# Patient Record
Sex: FEMALE | Race: BLACK OR AFRICAN AMERICAN | NOT HISPANIC OR LATINO | Employment: OTHER | URBAN - METROPOLITAN AREA
[De-identification: names, ages, dates, MRNs, and addresses within clinical notes are randomized per-mention and may not be internally consistent; named-entity substitution may affect disease eponyms.]

---

## 2023-03-03 PROBLEM — M25.519 SHOULDER PAIN: Status: ACTIVE | Noted: 2023-03-03

## 2023-03-03 PROBLEM — R60.9 EDEMA: Status: ACTIVE | Noted: 2023-03-03

## 2023-03-03 PROBLEM — N28.9 LOW KIDNEY FUNCTION: Status: ACTIVE | Noted: 2023-03-03

## 2023-03-03 PROBLEM — N64.4 BREAST PAIN IN FEMALE: Status: ACTIVE | Noted: 2023-03-03

## 2023-03-03 PROBLEM — E11.9 DIABETES MELLITUS (MULTI): Status: ACTIVE | Noted: 2023-03-03

## 2023-03-03 PROBLEM — L20.9 ATOPIC DERMATITIS: Status: ACTIVE | Noted: 2023-03-03

## 2023-03-03 PROBLEM — R35.0 URINARY FREQUENCY: Status: ACTIVE | Noted: 2023-03-03

## 2023-03-03 PROBLEM — R09.82 PND (POST-NASAL DRIP): Status: ACTIVE | Noted: 2023-03-03

## 2023-03-03 PROBLEM — J31.0 CHRONIC RHINITIS: Status: ACTIVE | Noted: 2023-03-03

## 2023-03-03 PROBLEM — R82.90 URINE ABNORMALITY: Status: ACTIVE | Noted: 2023-03-03

## 2023-03-03 PROBLEM — G62.9 PERIPHERAL POLYNEUROPATHY: Status: ACTIVE | Noted: 2023-03-03

## 2023-03-03 PROBLEM — E87.1 HYPONATREMIA: Status: ACTIVE | Noted: 2023-03-03

## 2023-03-03 PROBLEM — I10 BENIGN ESSENTIAL HYPERTENSION: Status: ACTIVE | Noted: 2023-03-03

## 2023-03-03 PROBLEM — G56.03 CARPAL TUNNEL SYNDROME, BILATERAL UPPER LIMBS: Status: ACTIVE | Noted: 2023-03-03

## 2023-03-03 PROBLEM — M50.90 DISORDER OF INTERVERTEBRAL DISC OF CERVICAL SPINE: Status: ACTIVE | Noted: 2023-03-03

## 2023-03-03 PROBLEM — M85.80 OSTEOPENIA: Status: ACTIVE | Noted: 2023-03-03

## 2023-03-03 PROBLEM — R29.898 HEAVY SENSATION OF LOWER EXTREMITY: Status: ACTIVE | Noted: 2023-03-03

## 2023-03-03 PROBLEM — R41.82 ALTERED MENTAL STATUS: Status: ACTIVE | Noted: 2023-03-03

## 2023-03-03 PROBLEM — M25.561 RIGHT KNEE PAIN: Status: ACTIVE | Noted: 2023-03-03

## 2023-03-03 PROBLEM — R07.9 CHEST PAIN: Status: ACTIVE | Noted: 2023-03-03

## 2023-03-03 PROBLEM — N81.10 VAGINAL PROLAPSE: Status: ACTIVE | Noted: 2023-03-03

## 2023-03-03 PROBLEM — M54.30 SCIATICA: Status: ACTIVE | Noted: 2023-03-03

## 2023-03-03 PROBLEM — N18.32 STAGE 3B CHRONIC KIDNEY DISEASE (MULTI): Status: ACTIVE | Noted: 2023-03-03

## 2023-03-03 PROBLEM — R20.2 HAND PARESTHESIA: Status: ACTIVE | Noted: 2023-03-03

## 2023-03-03 PROBLEM — R25.2 LEG CRAMPS: Status: ACTIVE | Noted: 2023-03-03

## 2023-03-03 PROBLEM — D64.9 ANEMIA: Status: ACTIVE | Noted: 2023-03-03

## 2023-03-03 PROBLEM — E87.5 HYPERKALEMIA: Status: ACTIVE | Noted: 2023-03-03

## 2023-03-03 PROBLEM — M67.40 GANGLION CYST: Status: ACTIVE | Noted: 2023-03-03

## 2023-03-03 PROBLEM — M54.2 NECK PAIN: Status: ACTIVE | Noted: 2023-03-03

## 2023-03-03 PROBLEM — K62.5 RECTAL BLEEDING: Status: ACTIVE | Noted: 2023-03-03

## 2023-03-03 PROBLEM — R68.89 SENSATION OF FEELING COLD: Status: ACTIVE | Noted: 2023-03-03

## 2023-03-03 PROBLEM — M79.672 LEFT FOOT PAIN: Status: ACTIVE | Noted: 2023-03-03

## 2023-03-03 PROBLEM — M19.90 OSTEOARTHROSIS: Status: ACTIVE | Noted: 2023-03-03

## 2023-03-03 PROBLEM — F32.9 DEPRESSION, MAJOR: Status: ACTIVE | Noted: 2023-03-03

## 2023-03-03 PROBLEM — K59.09 CHRONIC CONSTIPATION: Status: ACTIVE | Noted: 2023-03-03

## 2023-03-03 PROBLEM — U07.1 LAB TEST POSITIVE FOR DETECTION OF COVID-19 VIRUS: Status: ACTIVE | Noted: 2023-03-03

## 2023-03-03 PROBLEM — R21 SKIN RASH: Status: ACTIVE | Noted: 2023-03-03

## 2023-03-03 PROBLEM — F32.A DEPRESSION: Status: ACTIVE | Noted: 2023-03-03

## 2023-03-03 PROBLEM — H90.A32 MIXED CONDUCTIVE AND SENSORINEURAL HEARING LOSS OF LEFT EAR WITH RESTRICTED HEARING OF RIGHT EAR: Status: ACTIVE | Noted: 2023-03-03

## 2023-03-03 PROBLEM — E78.01 FAMILIAL HYPERCHOLESTEREMIA: Status: ACTIVE | Noted: 2023-03-03

## 2023-03-03 PROBLEM — M81.0 AGE RELATED OSTEOPOROSIS: Status: ACTIVE | Noted: 2023-03-03

## 2023-03-03 PROBLEM — K40.90 INGUINAL HERNIA, LEFT: Status: ACTIVE | Noted: 2023-03-03

## 2023-03-03 PROBLEM — R53.83 FATIGUE: Status: ACTIVE | Noted: 2023-03-03

## 2023-03-03 PROBLEM — N18.9 CKD (CHRONIC KIDNEY DISEASE): Status: ACTIVE | Noted: 2023-03-03

## 2023-03-03 PROBLEM — M54.50 LOW BACK PAIN: Status: ACTIVE | Noted: 2023-03-03

## 2023-03-03 PROBLEM — H93.13 TINNITUS OF BOTH EARS: Status: ACTIVE | Noted: 2023-03-03

## 2023-03-03 PROBLEM — G31.84 MILD COGNITIVE IMPAIRMENT: Status: ACTIVE | Noted: 2023-03-03

## 2023-03-03 RX ORDER — ASPIRIN 81 MG/1
81 TABLET ORAL DAILY
COMMUNITY
Start: 2017-04-06 | End: 2024-02-20 | Stop reason: SDUPTHER

## 2023-03-03 RX ORDER — BLOOD SUGAR DIAGNOSTIC
1 STRIP MISCELLANEOUS DAILY
COMMUNITY
Start: 2020-12-11 | End: 2023-05-04 | Stop reason: SDUPTHER

## 2023-03-03 RX ORDER — ACETAMINOPHEN 500 MG
50 TABLET ORAL DAILY
COMMUNITY
End: 2024-02-20 | Stop reason: SDUPTHER

## 2023-03-03 RX ORDER — CALCIUM CARBONATE 600 MG
1 TABLET ORAL DAILY
COMMUNITY
Start: 2017-04-06 | End: 2024-02-20 | Stop reason: SDUPTHER

## 2023-03-03 RX ORDER — MIRABEGRON 25 MG/1
1 TABLET, FILM COATED, EXTENDED RELEASE ORAL DAILY
COMMUNITY
Start: 2022-11-07 | End: 2024-02-20 | Stop reason: SDUPTHER

## 2023-03-03 RX ORDER — MAGNESIUM 250 MG
1 TABLET ORAL DAILY
COMMUNITY

## 2023-03-03 RX ORDER — CEPHRADINE 500 MG
1 CAPSULE ORAL DAILY
COMMUNITY

## 2023-03-03 RX ORDER — LANOLIN ALCOHOL/MO/W.PET/CERES
1 CREAM (GRAM) TOPICAL DAILY
COMMUNITY
Start: 2017-04-06 | End: 2024-02-20 | Stop reason: SDUPTHER

## 2023-03-03 RX ORDER — CALCITRIOL 0.25 UG/1
1 CAPSULE ORAL DAILY
COMMUNITY
Start: 2017-11-07 | End: 2023-04-11 | Stop reason: SDUPTHER

## 2023-03-03 RX ORDER — FLUTICASONE PROPIONATE 50 MCG
1 SPRAY, SUSPENSION (ML) NASAL 2 TIMES DAILY
COMMUNITY
Start: 2015-10-23 | End: 2023-04-11 | Stop reason: SDUPTHER

## 2023-03-03 RX ORDER — ATORVASTATIN CALCIUM 20 MG/1
1 TABLET, FILM COATED ORAL DAILY
COMMUNITY
Start: 2019-11-15 | End: 2023-03-14 | Stop reason: SDUPTHER

## 2023-03-03 RX ORDER — CITALOPRAM 10 MG/1
1 TABLET ORAL DAILY
COMMUNITY
Start: 2017-03-09 | End: 2023-03-14 | Stop reason: SDUPTHER

## 2023-03-03 RX ORDER — DOCUSATE SODIUM 100 MG/1
100 CAPSULE, LIQUID FILLED ORAL DAILY
COMMUNITY
Start: 2017-04-06 | End: 2024-02-20 | Stop reason: SDUPTHER

## 2023-03-03 RX ORDER — LANOLIN ALCOHOL/MO/W.PET/CERES
100 CREAM (GRAM) TOPICAL DAILY
COMMUNITY
End: 2023-08-07 | Stop reason: ALTCHOICE

## 2023-03-14 DIAGNOSIS — E78.01 FAMILIAL HYPERCHOLESTEREMIA: ICD-10-CM

## 2023-03-14 DIAGNOSIS — F32.9 MAJOR DEPRESSIVE DISORDER, REMISSION STATUS UNSPECIFIED, UNSPECIFIED WHETHER RECURRENT: Primary | ICD-10-CM

## 2023-03-14 RX ORDER — CITALOPRAM 10 MG/1
10 TABLET ORAL DAILY
Qty: 90 TABLET | Refills: 0 | Status: SHIPPED | OUTPATIENT
Start: 2023-03-14 | End: 2023-04-11 | Stop reason: SDUPTHER

## 2023-03-14 RX ORDER — ATORVASTATIN CALCIUM 20 MG/1
20 TABLET, FILM COATED ORAL DAILY
Qty: 90 TABLET | Refills: 0 | Status: SHIPPED | OUTPATIENT
Start: 2023-03-14 | End: 2023-04-11 | Stop reason: SDUPTHER

## 2023-04-11 ENCOUNTER — LAB (OUTPATIENT)
Dept: LAB | Facility: LAB | Age: 78
End: 2023-04-11
Payer: COMMERCIAL

## 2023-04-11 ENCOUNTER — OFFICE VISIT (OUTPATIENT)
Dept: PRIMARY CARE | Facility: CLINIC | Age: 78
End: 2023-04-11
Payer: COMMERCIAL

## 2023-04-11 DIAGNOSIS — E11.42 TYPE 2 DIABETES MELLITUS WITH DIABETIC POLYNEUROPATHY, WITHOUT LONG-TERM CURRENT USE OF INSULIN (MULTI): ICD-10-CM

## 2023-04-11 DIAGNOSIS — I10 HYPERTENSION, UNSPECIFIED TYPE: ICD-10-CM

## 2023-04-11 DIAGNOSIS — T78.40XS ALLERGY, SEQUELA: ICD-10-CM

## 2023-04-11 DIAGNOSIS — F32.9 MAJOR DEPRESSIVE DISORDER, REMISSION STATUS UNSPECIFIED, UNSPECIFIED WHETHER RECURRENT: ICD-10-CM

## 2023-04-11 DIAGNOSIS — E55.9 VITAMIN D DEFICIENCY: ICD-10-CM

## 2023-04-11 DIAGNOSIS — E78.01 FAMILIAL HYPERCHOLESTEREMIA: ICD-10-CM

## 2023-04-11 DIAGNOSIS — Z12.31 BREAST CANCER SCREENING BY MAMMOGRAM: Primary | ICD-10-CM

## 2023-04-11 LAB
ALANINE AMINOTRANSFERASE (SGPT) (U/L) IN SER/PLAS: 15 U/L (ref 7–45)
ALBUMIN (G/DL) IN SER/PLAS: 4 G/DL (ref 3.4–5)
ALBUMIN (MG/L) IN URINE: <7 MG/L
ALBUMIN/CREATININE (UG/MG) IN URINE: NORMAL UG/MG CRT (ref 0–30)
ALKALINE PHOSPHATASE (U/L) IN SER/PLAS: 76 U/L (ref 33–136)
ANION GAP IN SER/PLAS: 13 MMOL/L (ref 10–20)
ASPARTATE AMINOTRANSFERASE (SGOT) (U/L) IN SER/PLAS: 20 U/L (ref 9–39)
BILIRUBIN TOTAL (MG/DL) IN SER/PLAS: 0.3 MG/DL (ref 0–1.2)
CALCIUM (MG/DL) IN SER/PLAS: 9.8 MG/DL (ref 8.6–10.6)
CARBON DIOXIDE, TOTAL (MMOL/L) IN SER/PLAS: 30 MMOL/L (ref 21–32)
CHLORIDE (MMOL/L) IN SER/PLAS: 103 MMOL/L (ref 98–107)
CREATININE (MG/DL) IN SER/PLAS: 1.5 MG/DL (ref 0.5–1.05)
CREATININE (MG/DL) IN URINE: 28.8 MG/DL (ref 20–320)
ESTIMATED AVERAGE GLUCOSE FOR HBA1C: 120 MG/DL
GFR FEMALE: 35 ML/MIN/1.73M2
GLUCOSE (MG/DL) IN SER/PLAS: 83 MG/DL (ref 74–99)
HEMOGLOBIN A1C/HEMOGLOBIN TOTAL IN BLOOD: 5.8 %
POTASSIUM (MMOL/L) IN SER/PLAS: 4.5 MMOL/L (ref 3.5–5.3)
PROTEIN TOTAL: 6.8 G/DL (ref 6.4–8.2)
SODIUM (MMOL/L) IN SER/PLAS: 141 MMOL/L (ref 136–145)
UREA NITROGEN (MG/DL) IN SER/PLAS: 37 MG/DL (ref 6–23)

## 2023-04-11 PROCEDURE — 82570 ASSAY OF URINE CREATININE: CPT

## 2023-04-11 PROCEDURE — 82043 UR ALBUMIN QUANTITATIVE: CPT

## 2023-04-11 PROCEDURE — 36415 COLL VENOUS BLD VENIPUNCTURE: CPT

## 2023-04-11 PROCEDURE — 80053 COMPREHEN METABOLIC PANEL: CPT

## 2023-04-11 PROCEDURE — 83036 HEMOGLOBIN GLYCOSYLATED A1C: CPT

## 2023-04-11 PROCEDURE — 99214 OFFICE O/P EST MOD 30 MIN: CPT | Performed by: INTERNAL MEDICINE

## 2023-04-11 RX ORDER — CALCITRIOL 0.25 UG/1
0.25 CAPSULE ORAL DAILY
Qty: 90 CAPSULE | Refills: 0 | Status: SHIPPED | OUTPATIENT
Start: 2023-04-11 | End: 2023-11-13 | Stop reason: SDUPTHER

## 2023-04-11 RX ORDER — LANCETS
EACH MISCELLANEOUS
Qty: 100 EACH | Refills: 3 | Status: SHIPPED | OUTPATIENT
Start: 2023-04-11 | End: 2023-11-13 | Stop reason: WASHOUT

## 2023-04-11 RX ORDER — FLUTICASONE PROPIONATE 50 MCG
1 SPRAY, SUSPENSION (ML) NASAL 2 TIMES DAILY
Qty: 16 G | Refills: 0 | Status: SHIPPED | OUTPATIENT
Start: 2023-04-11 | End: 2023-07-06 | Stop reason: SDUPTHER

## 2023-04-11 RX ORDER — ATORVASTATIN CALCIUM 20 MG/1
20 TABLET, FILM COATED ORAL DAILY
Qty: 90 TABLET | Refills: 0 | Status: SHIPPED | OUTPATIENT
Start: 2023-04-11 | End: 2023-08-07 | Stop reason: DRUGHIGH

## 2023-04-11 RX ORDER — CITALOPRAM 10 MG/1
10 TABLET ORAL DAILY
Qty: 90 TABLET | Refills: 0 | Status: SHIPPED | OUTPATIENT
Start: 2023-04-11 | End: 2023-11-13 | Stop reason: SDUPTHER

## 2023-04-11 NOTE — PATIENT INSTRUCTIONS
Thank you for visiting Dr. Koenig and Dr. Albright at the clinic today. We discussed how fantastic your weight loss has been along with your lifestyle modifications. We also discussed your blood pressure being good. Medications are being tolerated and refills sent to [facility]. Mammogram and lab work ordered

## 2023-04-11 NOTE — PROGRESS NOTES
Subjective   Patient ID: Emma Oliva is a 77 y.o. female who presents for No chief complaint on file..  EMMA JENKINS is a 77 year old female with PMHx of HLD, DM II, depression, CKD III, vaginal prolapse and urinary incontinence, hx of b/l carpel tunnel and hx of HTN who presents to clinic for follow up visit. Patient reports she is doing well. She goes to the gym daily and works out with a senior citizen class, she meals preps and has lost a lot of weight since starting her lifestyle changes. Patient reports she does not need medicaitons for her blood pressure or diabetes anymore. Patient had home logs of BP and blood sugar where sugars and pressures were all within normal range.   Review of Systems  - CONSTITUTIONAL: Denies weight loss, fever and chills.  - HEENT: Denies changes in vision and hearing.  - RESPIRATORY: Denies SOB and cough.  - CV: Denies palpitations and CP.   - GI: Denies abdominal pain, nausea, vomiting and diarrhea.  - : Denies dysuria and urinary frequency.  - MSK: Denies myalgia and joint pain.  - SKIN: Denies rash and pruritus.  - NEUROLOGICAL: Denies headache and syncope. Admits to neuropathy  - PSYCHIATRIC: Denies recent changes in mood. Denies anxiety and depression.    Objective   Physical Exam    Constitutional: patient is alert and cooperative with exam  skin: dry and warm  Eyes: EOMI and clear sclera  ENMT: moist mucous membranes  Head/Neck: neck supple  Respiratory/Thorax: Clear to auscultation bilaterally, no wheezing or crackles appreciated  Cardiovascular: regular rate and rhythm, S1 and S2 present, no murmurs heard  Gastrointestinal: bowel sounds present, no pain or tenderness upon palpation, soft and nondistended  Extremities: +2 radial, posterior tibial, and pedal pulse, no lower extremity edema noted  Neurological: AxOx3  Assessment/Plan   Diagnoses and all orders for this visit:  Breast cancer screening by mammogram  -     BI mammo bilateral screening tomosynthesis;  Future  Hypertension, unspecified type  -     Comprehensive Metabolic Panel; Future  -     Albumin , Urine Random; Future  Type 2 diabetes mellitus with diabetic polyneuropathy, without long-term current use of insulin (CMS/[facility])  -     Comprehensive Metabolic Panel; Future  -     Hemoglobin A1c; Future  -     lancets misc; Use three times a day to check blood sugar  -     Diabetic Supplies Glucose Monitoring Strips  Familial hypercholesteremia  -     atorvastatin (Lipitor) 20 mg tablet; Take 1 tablet (20 mg) by mouth once daily.  Major depressive disorder, remission status unspecified, unspecified whether recurrent  -     citalopram (CeleXA) 10 mg tablet; Take 1 tablet (10 mg) by mouth once daily.  Allergy, sequela  -     fluticasone (Flonase) 50 mcg/actuation nasal spray; Administer 1 spray into each nostril in the morning and 1 spray before bedtime.  Vitamin D deficiency  -     calcitriol (Rocaltrol) 0.25 mcg capsule; Take 1 capsule (0.25 mcg) by mouth once daily.    KYRA ANGEL is a 77 year old female with PMHx of HLD, DM II, depression, CKD III, vaginal prolapse and urinary incontinence, hx of b/l carpel tunnel and hx of HTN who presents to clinic for follow up visit.     #HTN   - /80  - keeps home BP log, all BP readings at home are in goal BP range  - not on current mediciations     #HLD   - lipid panel reviewed  - atorvastatin 20mg QD     #DM II   - BG ranges 100-150  - monitors it closely   - A1c 4.3% in March 2022, repeat ordered     #Depression  - citalopram 10mg QD     #Vit D def  - calcitriol 0.25mcg     #Carpel Tunnel  - needs to be re-evaluated by Dr. David Rapp   - has used the braces and corticoid shots    #Health Maintenance   - Mammogram in March 2022 showed no mammographic evidence of malignancy --> no repeat necessary but pt requested to continue, repeat ordered   - DEXA scan to be performed on April 21, 2023  - coloscopy in 2015 showed diverticulosis -> repeat surveillance in 10 years if  clinically indicated, she will be 79 years old  - labs reviewed, CMP, A1c and urine albumin ordered   - vaccinations up to date    William Koenig  Internal Medicine, PGY-3  [facility].

## 2023-04-11 NOTE — PROGRESS NOTES
I reviewed with the resident the medical history and the resident’s findings on physical examination.  I discussed with the resident the patient’s diagnosis and concur with the treatment plan as documented in the resident note.     I saw and evaluated the patient. I personally obtained the key and critical portions of the history and physical exam or was physically present for key and critical portions performed by the trainee. I reviewed the trainee's documentation and discussed the patient with the trainee. I agree with the trainee's medical decision making, as documented on the trainee's note.     Natalee Eid MD

## 2023-05-04 DIAGNOSIS — E11.9 TYPE 2 DIABETES MELLITUS WITHOUT COMPLICATION, UNSPECIFIED WHETHER LONG TERM INSULIN USE (MULTI): ICD-10-CM

## 2023-05-04 RX ORDER — BLOOD SUGAR DIAGNOSTIC
1 STRIP MISCELLANEOUS DAILY
Qty: 300 STRIP | Refills: 2 | Status: SHIPPED | OUTPATIENT
Start: 2023-05-04 | End: 2024-02-20 | Stop reason: SDUPTHER

## 2023-06-07 ENCOUNTER — APPOINTMENT (OUTPATIENT)
Dept: PRIMARY CARE | Facility: CLINIC | Age: 78
End: 2023-06-07
Payer: COMMERCIAL

## 2023-07-06 DIAGNOSIS — T78.40XS ALLERGY, SEQUELA: ICD-10-CM

## 2023-07-06 RX ORDER — FLUTICASONE PROPIONATE 50 MCG
1 SPRAY, SUSPENSION (ML) NASAL 2 TIMES DAILY
Qty: 48 G | Refills: 0 | Status: SHIPPED | OUTPATIENT
Start: 2023-07-06 | End: 2023-11-13 | Stop reason: SDUPTHER

## 2023-07-20 ENCOUNTER — HOSPITAL ENCOUNTER (OUTPATIENT)
Dept: DATA CONVERSION | Facility: HOSPITAL | Age: 78
End: 2023-07-20
Attending: ORTHOPAEDIC SURGERY | Admitting: ORTHOPAEDIC SURGERY
Payer: COMMERCIAL

## 2023-07-20 DIAGNOSIS — G56.01 CARPAL TUNNEL SYNDROME, RIGHT UPPER LIMB: ICD-10-CM

## 2023-07-20 DIAGNOSIS — G56.03 CARPAL TUNNEL SYNDROME, BILATERAL UPPER LIMBS: ICD-10-CM

## 2023-07-20 LAB
POCT GLUCOSE: 101 MG/DL (ref 74–99)
POCT GLUCOSE: 69 MG/DL (ref 74–99)
POCT GLUCOSE: 86 MG/DL (ref 74–99)

## 2023-08-07 ENCOUNTER — OFFICE VISIT (OUTPATIENT)
Dept: PRIMARY CARE | Facility: CLINIC | Age: 78
End: 2023-08-07
Payer: COMMERCIAL

## 2023-08-07 VITALS
SYSTOLIC BLOOD PRESSURE: 112 MMHG | HEART RATE: 76 BPM | BODY MASS INDEX: 26.95 KG/M2 | DIASTOLIC BLOOD PRESSURE: 76 MMHG | WEIGHT: 157 LBS

## 2023-08-07 DIAGNOSIS — E78.01 FAMILIAL HYPERCHOLESTEREMIA: Primary | ICD-10-CM

## 2023-08-07 PROCEDURE — 1125F AMNT PAIN NOTED PAIN PRSNT: CPT | Performed by: INTERNAL MEDICINE

## 2023-08-07 PROCEDURE — 99214 OFFICE O/P EST MOD 30 MIN: CPT | Performed by: INTERNAL MEDICINE

## 2023-08-07 PROCEDURE — 3078F DIAST BP <80 MM HG: CPT | Performed by: INTERNAL MEDICINE

## 2023-08-07 PROCEDURE — 1159F MED LIST DOCD IN RCRD: CPT | Performed by: INTERNAL MEDICINE

## 2023-08-07 PROCEDURE — 3074F SYST BP LT 130 MM HG: CPT | Performed by: INTERNAL MEDICINE

## 2023-08-07 RX ORDER — POLYETHYLENE GLYCOL 3350 17 G/17G
POWDER, FOR SOLUTION ORAL
COMMUNITY
Start: 2023-03-07

## 2023-08-07 RX ORDER — ATORVASTATIN CALCIUM 40 MG/1
40 TABLET, FILM COATED ORAL DAILY
Qty: 30 TABLET | Refills: 5 | Status: SHIPPED | OUTPATIENT
Start: 2023-08-07 | End: 2024-02-20 | Stop reason: SDUPTHER

## 2023-08-07 ASSESSMENT — PATIENT HEALTH QUESTIONNAIRE - PHQ9
1. LITTLE INTEREST OR PLEASURE IN DOING THINGS: NOT AT ALL
2. FEELING DOWN, DEPRESSED OR HOPELESS: NOT AT ALL
SUM OF ALL RESPONSES TO PHQ9 QUESTIONS 1 AND 2: 0

## 2023-08-07 NOTE — ASSESSMENT & PLAN NOTE
Patient has high ASCVD score risk of 43.6%.  Her lipid panel done last October 2022 showed LDL level of 95  She takes Lipitor 20 mg daily    -Increasing Lipitor to 40 mg daily  -Ordered cardiac CT score to evaluate any risk for coronary disease.

## 2023-08-07 NOTE — PROGRESS NOTES
Subjective   Patient ID: Emma Oliva is a 78 y.o. female who presents for Follow-up.    EMMA JENKINS is a 77 year old female with PMHx of HLD, Prediabetes, depression, CKD III, vaginal prolapse and urinary incontinence, hx of b/l carpel tunnel and hx of HTN who presents to clinic for follow up visit.  Patient denies any complaints today.  She did have labs after her last visit.  Labs were discussed with her in detail as well as imaging's she recently had a right carpal tunnel release surgery last month.  She is following up with her surgeon regarding this.  For her CKD stage III she follows with her nephrologist once in a year.  Today her blood pressure was within normal limit.  She is compliant with her medications.    Review of Systems   Cardiovascular:  Positive for leg swelling.        Bl minimal leg swelling.    All other systems reviewed and are negative.      Objective   /76   Pulse 76   Wt 71.2 kg (157 lb)   BMI 26.95 kg/m²     Physical Exam  Constitutional:       Appearance: Normal appearance. She is normal weight.   Cardiovascular:      Rate and Rhythm: Normal rate and regular rhythm.      Pulses: Normal pulses.      Heart sounds: Murmur heard.      Friction rub present. Gallop present.   Pulmonary:      Effort: Pulmonary effort is normal.      Breath sounds: Normal breath sounds. No wheezing or rales.   Abdominal:      General: Abdomen is flat. Bowel sounds are normal.      Palpations: Abdomen is soft.      Tenderness: There is no abdominal tenderness. There is no guarding or rebound.   Musculoskeletal:         General: No swelling.      Right lower leg: Edema present.      Left lower leg: Edema present.      Comments: Bl minimal leg swelling.   Neurological:      Mental Status: She is alert.         Assessment/Plan   Problem List Items Addressed This Visit          Cardiac and Vasculature    Familial hypercholesteremia - Primary    Relevant Medications    atorvastatin (Lipitor) 40 mg tablet     Other Relevant Orders    CT cardiac scoring wo IV contrast   #HTN   - Today's /76, well controlled.  - currently not on any medications     #HLD   Patient has high ASCVD score risk of 43.6%.  Her lipid panel done last October 2022 showed LDL level of 95  She takes Lipitor 20 mg daily  -Increasing Lipitor to 40 mg daily  -Ordered cardiac CT score to evaluate any risk for coronary disease.      #Prediabetes  - A1c 5.8 in April 2023, Repeat in 6 months.  - monitor closely.     #Depression  - citalopram 10mg QD      #Vit D def  - calcitriol 0.25mcg      #Carpel Tunnel  - had CT release surgery of right CT on 07/20/23, follows with Dr Rapp.     # CKD Stage III  - Most recent Cr 1.50,   - Follows with her nephrologist once in a year.      #Health Maintenance   - Mammogram in April 2023 showed no mammographic evidence of malignancy   - DEXA scan on April 21, 2023 showed no risk of osteoporosis with a Tscore of 1.1 in femur neck  - coloscopy in 2015 showed diverticulosis -> repeat surveillance in 10 years if clinically indicated, she will be 79 years old  - labs reviewed.  - vaccinations up to date  - F/U in 3 months.

## 2023-09-01 PROBLEM — H92.03 OTALGIA OF BOTH EARS: Status: ACTIVE | Noted: 2023-09-01

## 2023-09-01 PROBLEM — G95.9 CERVICAL MYELOPATHY (MULTI): Status: ACTIVE | Noted: 2023-09-01

## 2023-09-01 PROBLEM — J34.89 NASAL SORE: Status: ACTIVE | Noted: 2023-09-01

## 2023-09-01 PROBLEM — E11.40 CONTROLLED DIABETES MELLITUS WITH DIABETIC NEUROPATHY (MULTI): Status: ACTIVE | Noted: 2023-09-01

## 2023-09-01 PROBLEM — H93.8X9 CLOGGED EAR: Status: ACTIVE | Noted: 2023-09-01

## 2023-09-01 PROBLEM — H61.23 EXCESSIVE CERUMEN IN BOTH EAR CANALS: Status: ACTIVE | Noted: 2023-09-01

## 2023-09-01 PROBLEM — H90.A21 SENSORINEURAL HEARING LOSS (SNHL) OF RIGHT EAR WITH RESTRICTED HEARING OF LEFT EAR: Status: ACTIVE | Noted: 2023-09-01

## 2023-09-01 RX ORDER — LANOLIN ALCOHOL/MO/W.PET/CERES
CREAM (GRAM) TOPICAL
COMMUNITY
End: 2023-11-13 | Stop reason: WASHOUT

## 2023-09-01 RX ORDER — POLYVINYL ALCOHOL 14 MG/ML
SOLUTION/ DROPS OPHTHALMIC
COMMUNITY

## 2023-09-01 RX ORDER — KETOCONAZOLE 20 MG/G
2 CREAM TOPICAL
COMMUNITY
Start: 2023-04-18 | End: 2023-11-13 | Stop reason: WASHOUT

## 2023-09-29 VITALS — WEIGHT: 154.98 LBS | HEIGHT: 60 IN | BODY MASS INDEX: 30.43 KG/M2

## 2023-09-30 NOTE — H&P
History of Present Illness:   History Present Illness:  Reason for surgery: R carpal tunnel syndrome   HPI:    77 yo F with R carpal tunnel syndrome presenting today for R carpal tunnel release with Dr. Rapp. Patient has failed conservative treatment measures, is indicated  for surgery, and elects to proceed.    Allergies:        Allergies:  ·  No Known Allergies :     Home Medication Review:   Home Medications Reviewed: yes     Impression/Procedure:   ·  Impression and Planned Procedure: R carpal tunnel syndrome, plan for R carpal tunnel release       ERAS (Enhanced Recovery After Surgery):  ·  ERAS Patient: no       Physical Exam by System:    Constitutional: No acute distress, alert and oriented   Eyes: EOMI   ENMT: Trachea midline   Head/Neck: Normocephalic, atraumatic   Respiratory/Thorax: Normal work of breathing on room  air   Cardiovascular: Regular rate and rhythm on palpation  of peripheral pulses   Gastrointestinal: Abdomen non-distended   Musculoskeletal: Bilateral hand examination reveals  diffuse degenerative changes. She has thenar atrophy bilaterally, worse on the right. She has numbness and tingling primarily in the thumb, index, and middle finger bilaterally. Positive Tinel's sign bilaterally, worse on the right. She has overall finger  range of motion limitations, likely related to her arthritis. No evidence of any obvious trigger fingers   Extremities: See MSK   Neurological: See MSK, otherwise sensation and motor  intact throughout   Psychological: Appropriate mood and affect   Skin: Intact     Consent:   COVID-19 Consent:  ·  COVID-19 Risk Consent Surgeon has reviewed key risks related to the risk of jeremiah COVID-19 and if they contract COVID-19 what the risks are.     Attestation:   Note Completion:  I am a:  Resident/Fellow   Attending Attestation I saw and evaluated the patient.  I personally obtained the key and critical portions of the history and physical exam or was physically  present for key and  critical portions performed by the resident/fellow. I reviewed the resident/fellow?s documentation and discussed the patient with the resident/fellow.  I agree with the resident/fellow?s medical decision making as documented in the note.     I personally evaluated the patient on 20-Jul-2023         Electronic Signatures:  David Rapp)  (Signed 20-Jul-2023 16:34)   Authored: Note Completion   Co-Signer: History of Present Illness, Allergies, Home Medication Review, Impression/Procedure, ERAS, Physical Exam, Consent, Note Completion  Sánchez Ho (Resident))  (Signed 20-Jul-2023 06:50)   Authored: History of Present Illness, Allergies, Home  Medication Review, Impression/Procedure, ERAS, Physical Exam, Consent, Note Completion      Last Updated: 20-Jul-2023 16:34 by David Rapp)

## 2023-10-02 NOTE — OP NOTE
Post Operative Note:     PreOp Diagnosis: Right carpal tunnel syndrome   Post-Procedure Diagnosis: Right carpal tunnel syndrome   Procedure: 1. Right carpal tunnel release   Surgeon: David Rapp MD   Resident/Fellow/Other Assistant: Sánchez Ho MD  (PGY-4)   Anesthesia: MAC   Estimated Blood Loss (mL): 1   Specimen: no   Complications: None   Findings: See operative dictation   Patient Returned To/Condition: PACU in satisfactory  condition   Drains and/or Catheters: None   Tourniquet Times: 10 min at 250 mmHg   Implants: None     Operative Report Dictated:  Dictation: not applicable - note contains Operative  Report    Operative Report:    Date: July 20, 2023    Preoperative diagnosis: Right carpal tunnel syndrome    Postoperative diagnosis: Right carpal tunnel syndrome    Procedure performed: Right carpal tunnel decompression    Surgeon: David Rapp M.D.    Assistant: Sánchez Ho MD      Anesthesia: Monitored Anesthesia Care    Indications:  Patient presents to the operating room today for surgical release of the carpal tunnel after failing attempted conservative management. In the preoperative area and the correct operative site was identified and marked for surgery. Informed consent process  was completed.    Details of procedure:  The patient was brought to the operating room and placed supine on the operating table. A timeout procedure performed to verify the correct patient, procedure and operative site. Please refer to anesthesia notes regarding administration of antibiotics.  After an appropriate amount of IV sedation had been obtained local anesthetic was infiltrated in the base of the palm. The upper extremity was then prepped and draped in the usual sterile fashion. The limb was exsanguinated and a tourniquet was inflated  to 250 mmHg.    A longitudinal incision was created in the central aspect of the proximal palm. Sharp dissection was carried through the skin and subcutaneous tissue.  Superficial palmar fascia was divided longitudinally. Deep retractors are placed. The transverse carpal  ligament was identified. Under direct visualization this structure was divided longitudinally along its ulnar margin. Complete median nerve decompression was obtained. The wound was irrigated and closed in interrupted fashion.    A sterile bandage was applied. The tourniquet was deflated. Patient was transferred back to recovery room in stable condition.    Postoperative plan will be for the patient to be discharged home once comfortable. Patient may remove bandage on postoperative day #4 and begin wound care and gentle activities as instructed. Return to clinic in approximately 2 weeks for wound check,  suture removal and advancement of activities.    I am of the attending this procedure and I was present for the entire case.    David Rapp M.D.      Attestation:   Note Completion:  I am a: Resident/Fellow   Attending Attestation I was present for the entire procedure          Electronic Signatures:  David Rapp)  (Signed 20-Jul-2023 16:35)   Authored: Post Operative Note, Note Completion   Co-Signer: Post Operative Note, Note Completion  Sánchez Ho (Resident))  (Signed 20-Jul-2023 13:18)   Authored: Post Operative Note, Note Completion      Last Updated: 20-Jul-2023 16:35 by David Rapp)

## 2023-10-10 ENCOUNTER — OFFICE VISIT (OUTPATIENT)
Dept: OBSTETRICS AND GYNECOLOGY | Facility: CLINIC | Age: 78
End: 2023-10-10
Payer: COMMERCIAL

## 2023-10-10 VITALS
SYSTOLIC BLOOD PRESSURE: 118 MMHG | HEIGHT: 62 IN | WEIGHT: 155 LBS | DIASTOLIC BLOOD PRESSURE: 70 MMHG | BODY MASS INDEX: 28.52 KG/M2

## 2023-10-10 DIAGNOSIS — N39.41 URGE URINARY INCONTINENCE: ICD-10-CM

## 2023-10-10 DIAGNOSIS — L98.421 SKIN ULCER OF SACRUM, LIMITED TO BREAKDOWN OF SKIN (MULTI): Primary | ICD-10-CM

## 2023-10-10 LAB
POC APPEARANCE, URINE: CLEAR
POC BILIRUBIN, URINE: NEGATIVE
POC BLOOD, URINE: NEGATIVE
POC COLOR, URINE: YELLOW
POC GLUCOSE, URINE: NEGATIVE MG/DL
POC KETONES, URINE: NEGATIVE MG/DL
POC LEUKOCYTES, URINE: NEGATIVE
POC NITRITE,URINE: NEGATIVE
POC PH, URINE: 6.5 PH
POC PROTEIN, URINE: NEGATIVE MG/DL
POC SPECIFIC GRAVITY, URINE: 1.01
POC UROBILINOGEN, URINE: 0.2 EU/DL

## 2023-10-10 PROCEDURE — 3074F SYST BP LT 130 MM HG: CPT | Performed by: NURSE PRACTITIONER

## 2023-10-10 PROCEDURE — 81003 URINALYSIS AUTO W/O SCOPE: CPT | Mod: QW | Performed by: NURSE PRACTITIONER

## 2023-10-10 PROCEDURE — 1159F MED LIST DOCD IN RCRD: CPT | Performed by: NURSE PRACTITIONER

## 2023-10-10 PROCEDURE — 1036F TOBACCO NON-USER: CPT | Performed by: NURSE PRACTITIONER

## 2023-10-10 PROCEDURE — 99213 OFFICE O/P EST LOW 20 MIN: CPT | Performed by: NURSE PRACTITIONER

## 2023-10-10 PROCEDURE — 1126F AMNT PAIN NOTED NONE PRSNT: CPT | Performed by: NURSE PRACTITIONER

## 2023-10-10 PROCEDURE — 3078F DIAST BP <80 MM HG: CPT | Performed by: NURSE PRACTITIONER

## 2023-10-10 PROCEDURE — 1160F RVW MEDS BY RX/DR IN RCRD: CPT | Performed by: NURSE PRACTITIONER

## 2023-10-10 ASSESSMENT — PATIENT HEALTH QUESTIONNAIRE - PHQ9
SUM OF ALL RESPONSES TO PHQ9 QUESTIONS 1 AND 2: 0
1. LITTLE INTEREST OR PLEASURE IN DOING THINGS: NOT AT ALL
2. FEELING DOWN, DEPRESSED OR HOPELESS: NOT AT ALL

## 2023-10-10 ASSESSMENT — PAIN SCALES - GENERAL: PAINLEVEL: 0-NO PAIN

## 2023-10-10 NOTE — PROGRESS NOTES
Assessment/Plan  78 y.o. female assessed in clinic today for Vaginal prolapse, Chronic constipation     Plan  1. Vaginal prolapse, Stage 2, will monitor  - Upon exam, Severe atrophy and muscle tension  - Continue Myrbetriq 25 MG er    2. Chronic constipation  - Continue MiraLAX     3. Wound care clinic placed to further assess and care for sacral skin breakdown     RTC in 6 months with LORI Parmar     I, Chacha Crawford am scribing for virtually, and in the presence of LORI Parmar on 10/10/2023 at 3:26 PM.   LORI Osullivan    History of Present Illness    HPI  Emma Oliva is a 78 y.o. female who presents to the clinic today for Urinary urgency    - Vaginal prolapse and chronic constipation   - had a telehealth visit in Dec. '22  - takes Myrbetriq 25 MG for Urge incontinence   - April 4, tried a pessary, the smallest 0 ring pessary, but would not fit and was to uncomfortable for her   - Severely atrophic vaginally   - Was also supposed to see GI and take MiraLAX for constipation  Worried about an open sore on her tailbone.    PVR: 0     Urinary Symptoms:  - Myrbetriq 25MG is effective, reports having less UI, but is currently bothered by prolapse due to the moisture that us left behind after urinating   - No longer has accidents in her underwear     Prolapse is Stage 2 and the pressure she feels is a tight pelvic floor.    Bowel Symptoms:  - MiraLAX has been working for her   - Has a bowel movement every other day    Skin, sacrum with Stage 1 skin breakdown

## 2023-10-23 ENCOUNTER — OFFICE VISIT (OUTPATIENT)
Dept: WOUND CARE | Facility: CLINIC | Age: 78
End: 2023-10-23
Payer: COMMERCIAL

## 2023-10-23 PROCEDURE — 1126F AMNT PAIN NOTED NONE PRSNT: CPT | Performed by: NURSE PRACTITIONER

## 2023-10-23 PROCEDURE — 1036F TOBACCO NON-USER: CPT | Performed by: NURSE PRACTITIONER

## 2023-10-23 PROCEDURE — 99203 OFFICE O/P NEW LOW 30 MIN: CPT | Performed by: NURSE PRACTITIONER

## 2023-10-23 PROCEDURE — 1159F MED LIST DOCD IN RCRD: CPT | Performed by: NURSE PRACTITIONER

## 2023-10-23 PROCEDURE — 99214 OFFICE O/P EST MOD 30 MIN: CPT

## 2023-11-08 ENCOUNTER — ANCILLARY PROCEDURE (OUTPATIENT)
Dept: RADIOLOGY | Facility: CLINIC | Age: 78
End: 2023-11-08
Payer: COMMERCIAL

## 2023-11-08 DIAGNOSIS — E78.01 FAMILIAL HYPERCHOLESTEROLEMIA: ICD-10-CM

## 2023-11-08 PROCEDURE — 75571 CT HRT W/O DYE W/CA TEST: CPT

## 2023-11-09 NOTE — RESULT ENCOUNTER NOTE
Spoke to patient she states there was a death in the family and she will reschedule appointment at a later day   The patient will be seeing my o November 13th and we will discuss referral to a cardiologist.

## 2023-11-13 ENCOUNTER — OFFICE VISIT (OUTPATIENT)
Dept: PRIMARY CARE | Facility: CLINIC | Age: 78
End: 2023-11-13
Payer: COMMERCIAL

## 2023-11-13 ENCOUNTER — LAB (OUTPATIENT)
Dept: LAB | Facility: LAB | Age: 78
End: 2023-11-13
Payer: COMMERCIAL

## 2023-11-13 ENCOUNTER — APPOINTMENT (OUTPATIENT)
Dept: PRIMARY CARE | Facility: CLINIC | Age: 78
End: 2023-11-13
Payer: COMMERCIAL

## 2023-11-13 VITALS — DIASTOLIC BLOOD PRESSURE: 70 MMHG | SYSTOLIC BLOOD PRESSURE: 118 MMHG | WEIGHT: 153 LBS | BODY MASS INDEX: 27.98 KG/M2

## 2023-11-13 DIAGNOSIS — I10 HYPERTENSION, UNSPECIFIED TYPE: Primary | ICD-10-CM

## 2023-11-13 DIAGNOSIS — Z23 NEED FOR IMMUNIZATION AGAINST INFLUENZA: ICD-10-CM

## 2023-11-13 DIAGNOSIS — I10 HYPERTENSION, UNSPECIFIED TYPE: ICD-10-CM

## 2023-11-13 DIAGNOSIS — T78.40XS ALLERGY, SEQUELA: ICD-10-CM

## 2023-11-13 DIAGNOSIS — E55.9 VITAMIN D DEFICIENCY: ICD-10-CM

## 2023-11-13 DIAGNOSIS — R73.03 PREDIABETES: ICD-10-CM

## 2023-11-13 DIAGNOSIS — R93.1 ABNORMAL HEART SCORE CT: ICD-10-CM

## 2023-11-13 DIAGNOSIS — F32.9 MAJOR DEPRESSIVE DISORDER, REMISSION STATUS UNSPECIFIED, UNSPECIFIED WHETHER RECURRENT: ICD-10-CM

## 2023-11-13 PROCEDURE — 3074F SYST BP LT 130 MM HG: CPT | Performed by: INTERNAL MEDICINE

## 2023-11-13 PROCEDURE — 80053 COMPREHEN METABOLIC PANEL: CPT

## 2023-11-13 PROCEDURE — G0008 ADMIN INFLUENZA VIRUS VAC: HCPCS | Performed by: INTERNAL MEDICINE

## 2023-11-13 PROCEDURE — 90662 IIV NO PRSV INCREASED AG IM: CPT | Performed by: INTERNAL MEDICINE

## 2023-11-13 PROCEDURE — 3078F DIAST BP <80 MM HG: CPT | Performed by: INTERNAL MEDICINE

## 2023-11-13 PROCEDURE — 1126F AMNT PAIN NOTED NONE PRSNT: CPT | Performed by: INTERNAL MEDICINE

## 2023-11-13 PROCEDURE — 36415 COLL VENOUS BLD VENIPUNCTURE: CPT

## 2023-11-13 PROCEDURE — 1160F RVW MEDS BY RX/DR IN RCRD: CPT | Performed by: INTERNAL MEDICINE

## 2023-11-13 PROCEDURE — 1159F MED LIST DOCD IN RCRD: CPT | Performed by: INTERNAL MEDICINE

## 2023-11-13 PROCEDURE — 69210 REMOVE IMPACTED EAR WAX UNI: CPT | Performed by: INTERNAL MEDICINE

## 2023-11-13 PROCEDURE — 1036F TOBACCO NON-USER: CPT | Performed by: INTERNAL MEDICINE

## 2023-11-13 PROCEDURE — 99214 OFFICE O/P EST MOD 30 MIN: CPT | Performed by: INTERNAL MEDICINE

## 2023-11-13 PROCEDURE — 83036 HEMOGLOBIN GLYCOSYLATED A1C: CPT

## 2023-11-13 RX ORDER — FLUTICASONE PROPIONATE 50 MCG
1 SPRAY, SUSPENSION (ML) NASAL 2 TIMES DAILY
Qty: 48 G | Refills: 0 | Status: SHIPPED | OUTPATIENT
Start: 2023-11-13 | End: 2024-02-20 | Stop reason: SDUPTHER

## 2023-11-13 RX ORDER — CITALOPRAM 10 MG/1
10 TABLET ORAL DAILY
Qty: 90 TABLET | Refills: 0 | Status: SHIPPED | OUTPATIENT
Start: 2023-11-13 | End: 2024-02-20 | Stop reason: SDUPTHER

## 2023-11-13 RX ORDER — CALCITRIOL 0.25 UG/1
0.25 CAPSULE ORAL DAILY
Qty: 90 CAPSULE | Refills: 0 | Status: SHIPPED | OUTPATIENT
Start: 2023-11-13 | End: 2024-02-20 | Stop reason: SDUPTHER

## 2023-11-13 ASSESSMENT — ENCOUNTER SYMPTOMS
FEVER: 0
DYSPNEA ON EXERTION: 0
CHILLS: 0
WHEEZING: 0
EYES NEGATIVE: 1
NAUSEA: 0
VOMITING: 0
COUGH: 0
HEARTBURN: 1
LIGHT-HEADEDNESS: 0
FREQUENCY: 1
SHORTNESS OF BREATH: 0
HEADACHES: 0
DIARRHEA: 0
HEMATURIA: 0
PALPITATIONS: 0
BACK PAIN: 1
HEMATOCHEZIA: 0
CONSTIPATION: 1
SORE THROAT: 0
DIZZINESS: 0
WEIGHT LOSS: 0
ABDOMINAL PAIN: 0
WEIGHT GAIN: 0
DYSURIA: 0

## 2023-11-13 ASSESSMENT — PATIENT HEALTH QUESTIONNAIRE - PHQ9
2. FEELING DOWN, DEPRESSED OR HOPELESS: NOT AT ALL
SUM OF ALL RESPONSES TO PHQ9 QUESTIONS 1 AND 2: 0
1. LITTLE INTEREST OR PLEASURE IN DOING THINGS: NOT AT ALL

## 2023-11-13 NOTE — PROGRESS NOTES
Subjective   Emma Oliva is a 78 y.o. female with PMH HTN, HLD, well controlled diabetes, CKD stage 3, vaginal prolapse, constipation, and depression who presents for a follow up visit. Patient reports a thumping noise sensation in her right ear sometimes when she chew on her gums, which self resolves. Otherwise, patient enjoys going to the gym and exercising. No acute complaints otherwise.      Chief Complaint:  Follow-up    Review of Systems   Constitutional: Negative for chills, fever, weight gain and weight loss.   HENT:  Positive for ear pain (Right sided thumping pain, patient reports chewing on her gums that cause the pain, self resolves). Negative for hearing loss, sore throat and tinnitus.    Eyes: Negative.         Has dry eyes bilaterally, sometimes has foreign body sensation in medial eye with associated redness   Cardiovascular:  Negative for chest pain, dyspnea on exertion, leg swelling and palpitations.   Respiratory:  Negative for cough, shortness of breath and wheezing.    Musculoskeletal:  Positive for back pain (Lower back pain).        Reports some pain in bilateral shoulders, knees, and ankles, however patient is able to exercise well   Gastrointestinal:  Positive for constipation (Well controlled with Miralax) and heartburn (Takes Tums PRN for heartburn). Negative for abdominal pain, diarrhea, hematochezia, melena, nausea and vomiting.   Genitourinary:  Positive for frequency and urgency. Negative for dysuria and hematuria.        Mirabegron controls urinary urgency and frequency well   Neurological:  Negative for dizziness, headaches and light-headedness.       Objective   Vitals reviewed.   Constitutional:       Appearance: Healthy appearance. Not in distress.   Pulmonary:      Effort: Pulmonary effort is normal.      Breath sounds: Normal breath sounds. No wheezing. No rhonchi.   Chest:      Chest wall: Not tender to palpatation.   Cardiovascular:      Normal rate. Regular rhythm.       "Murmurs: There is no murmur.   Pulses:     Intact distal pulses.   Edema:     Peripheral edema absent.   Abdominal:      General: Bowel sounds are normal. There is no distension.      Palpations: Abdomen is soft.      Tenderness: There is no abdominal tenderness.   Musculoskeletal: Normal range of motion.         General: Tenderness present. Neurological:      General: No focal deficit present.      Mental Status: Alert and oriented to person, place and time.         Lab Review:   Office Visit on 10/10/2023   Component Date Value    POC Color, Urine 10/10/2023 Yellow     POC Appearance, Urine 10/10/2023 Clear     POC Specific Gravity, Ur* 10/10/2023 1.015     POC PH, Urine 10/10/2023 6.5     POC Protein, Urine 10/10/2023 NEGATIVE     POC Glucose, Urine 10/10/2023 NEGATIVE     POC Blood, Urine 10/10/2023 NEGATIVE     POC Ketones, Urine 10/10/2023 NEGATIVE     POC Bilirubin, Urine 10/10/2023 NEGATIVE     POC Urobilinogen, Urine 10/10/2023 0.2     Poc Nitrite, Urine 10/10/2023 NEGATIVE     POC Leukocytes, Urine 10/10/2023 NEGATIVE      Lab Results   Component Value Date     04/11/2023    K 4.5 04/11/2023     04/11/2023    CO2 30 04/11/2023    BUN 37 (H) 04/11/2023    CREATININE 1.50 (H) 04/11/2023    GLUCOSE 83 04/11/2023    CALCIUM 9.8 04/11/2023     No results found for: \"CKTOTAL\", \"CKMB\", \"CKMBINDEX\", \"TROPONINI\"  Lab Results   Component Value Date    WBC 2.6 (L) 10/05/2022    HGB 12.6 10/05/2022    HCT 39.2 10/05/2022    MCV 98 10/05/2022     10/05/2022     Lab Results   Component Value Date    CHOL 188 10/05/2022    TRIG 85 10/05/2022    HDL 76.4 10/05/2022       Assessment/Plan   Diagnoses of Vitamin D deficiency, Major depressive disorder, remission status unspecified, unspecified whether recurrent, and Allergy, sequela were pertinent to this visit.    #Follow up visit  #High cardiac CT score  - Patient was found to have significant wax in left ear, attempted washout with no success. " Instructed her to try Debrox  - Patient was found to have a high cardiac CT score of 1242. Scheduled cardiology appointment on 11/28 @ 9 AM for follow up   - Patient will follow up in February  - Ordered repeat CMP and A1c  - Patient received flu shot today  - Ordered refill for calcitriol and Flonase    #HTN  - Currently not on any medications  - /70 and stable  - Continuing healthy lifestyle modifications    #HLD  - Continue home atorvastatin 40 mg daily    #Diabetes  - Currently well controlled with lifestyle, A1c in April is 5.8.  - Ordered repeat A1c, will follow up    #Vaginal prolapse  #Urinary urgency history  - Patient follows up with urogynecology OP  - Continue mirabegron 25 mg once a day, patient has improvement of symptoms    #CKD  - Ordered CMP this afternoon to follow up on kidney function  - Baseline Cr ranges between 1.3-1.5    #Depression  - Continue home citalopram 10 mg, ordered refill

## 2023-11-13 NOTE — PATIENT INSTRUCTIONS
By optimizing your health through good nutrition, daily exercise, stress management, low/moderate alcohol and avoidance of tobacco, sugar and processed foods, you can help to decrease your risk of cardiovascular disease and stroke and achieve a healthy weight. A diet rich in whole, plant-based foods such as vegetables, beans, seeds, nuts, whole grains, healthy fats and fruit - leads to lower body mass index, blood pressure, HbA1C, and cholesterol levels.     Heart Healthy Tips:     -We recommend you follow a heart healthy diet. Watch food labels and try not to  eat more than 2,500 mg of sodium per day. Avoid foods high in salt like  processed meats (lunch meats, kemp, and sausage), processed foods (boxed  dinners, canned soups), fried and fast foods. Monitor serving sizes and if the  sodium per serving size is more than 200 mg, avoid those foods. If the sodium  per serving size is between 100-200 mg, you can use those in limited quantities.  Try to choose foods where the amount of sodium per serving size is less than 100  mg. Try to eat a diet rich in fruits and vegetables, whole grains, low fat dairy  products, skinless poultry and fish, nuts, beans, non-tropical vegetable oils.  Limit saturated fat, trans fat, sodium, red meats, and sugar-sweetened  beverages. Limit alcohol    -The combination of a reduced-calorie diet and increased physical activity is  recommended. Adults should aim to get at least 150 minutes of moderate physical  activity per week (30 minutes of moderate physical activities at least 5 days  per week). Examples of moderate physical activities include brisk walking,  swimming, aerobic dancing, heavy gardening, jumping rope, bicycling 10 MPH or  faster, tennis, hiking uphill or with a heavy backpack. Please let us know if  you would like to learn more about your nutrition and calories and additional  options including weight loss programs to help you reach your goal.     -If you smoke, stop  smoking. If you stop smoking you can help get rid of a major  source of stress to your heart. Smoking makes your heart rate and blood pressure  go up and increases your risk or developing cardiovascular diseases and worsen  symptoms associated with heart failure.     -Obtain a BP monitor and monitor your BP daily. Check it around the same time  each day; at least 1 hour after taking your medications. Record your BP in a log  and bring your log with you to your doctor?s appointment.

## 2023-11-13 NOTE — PROGRESS NOTES
I saw and evaluated the patient. I personally obtained the key and critical portions of the history and physical exam or was physically present for key and critical portions performed by the resident/fellow. I reviewed the resident/fellow's documentation and discussed the patient with the resident/fellow. I agree with the resident/fellow's medical decision making as documented in the note.    Natalee Eid MD

## 2023-11-14 LAB
ALBUMIN SERPL BCP-MCNC: 4.3 G/DL (ref 3.4–5)
ALP SERPL-CCNC: 76 U/L (ref 33–136)
ALT SERPL W P-5'-P-CCNC: 18 U/L (ref 7–45)
ANION GAP SERPL CALC-SCNC: 14 MMOL/L (ref 10–20)
AST SERPL W P-5'-P-CCNC: 22 U/L (ref 9–39)
BILIRUB SERPL-MCNC: 0.4 MG/DL (ref 0–1.2)
BUN SERPL-MCNC: 32 MG/DL (ref 6–23)
CALCIUM SERPL-MCNC: 9.3 MG/DL (ref 8.6–10.6)
CHLORIDE SERPL-SCNC: 102 MMOL/L (ref 98–107)
CO2 SERPL-SCNC: 28 MMOL/L (ref 21–32)
CREAT SERPL-MCNC: 1.47 MG/DL (ref 0.5–1.05)
EST. AVERAGE GLUCOSE BLD GHB EST-MCNC: 114 MG/DL
GFR SERPL CREATININE-BSD FRML MDRD: 36 ML/MIN/1.73M*2
GLUCOSE SERPL-MCNC: 76 MG/DL (ref 74–99)
HBA1C MFR BLD: 5.6 %
POTASSIUM SERPL-SCNC: 4.5 MMOL/L (ref 3.5–5.3)
PROT SERPL-MCNC: 7.3 G/DL (ref 6.4–8.2)
SODIUM SERPL-SCNC: 139 MMOL/L (ref 136–145)

## 2023-11-28 ENCOUNTER — APPOINTMENT (OUTPATIENT)
Dept: CARDIOLOGY | Facility: HOSPITAL | Age: 78
End: 2023-11-28
Payer: COMMERCIAL

## 2024-01-30 ENCOUNTER — HOSPITAL ENCOUNTER (OUTPATIENT)
Dept: RADIOLOGY | Facility: CLINIC | Age: 79
Discharge: HOME | End: 2024-01-30
Payer: COMMERCIAL

## 2024-01-30 ENCOUNTER — OFFICE VISIT (OUTPATIENT)
Dept: CARDIOLOGY | Facility: HOSPITAL | Age: 79
End: 2024-01-30
Payer: COMMERCIAL

## 2024-01-30 ENCOUNTER — LAB (OUTPATIENT)
Dept: LAB | Facility: LAB | Age: 79
End: 2024-01-30
Payer: COMMERCIAL

## 2024-01-30 VITALS
DIASTOLIC BLOOD PRESSURE: 77 MMHG | SYSTOLIC BLOOD PRESSURE: 129 MMHG | BODY MASS INDEX: 28.63 KG/M2 | HEIGHT: 62 IN | WEIGHT: 155.6 LBS | HEART RATE: 77 BPM | OXYGEN SATURATION: 99 %

## 2024-01-30 DIAGNOSIS — S89.92XA INJURY OF LEFT LOWER EXTREMITY, INITIAL ENCOUNTER: ICD-10-CM

## 2024-01-30 DIAGNOSIS — S69.92XA LEFT WRIST INJURY, INITIAL ENCOUNTER: ICD-10-CM

## 2024-01-30 DIAGNOSIS — I10 BENIGN ESSENTIAL HYPERTENSION: Primary | ICD-10-CM

## 2024-01-30 DIAGNOSIS — E78.01 FAMILIAL HYPERCHOLESTEREMIA: ICD-10-CM

## 2024-01-30 DIAGNOSIS — R93.1 ABNORMAL HEART SCORE CT: ICD-10-CM

## 2024-01-30 DIAGNOSIS — M79.89 LEG SWELLING: ICD-10-CM

## 2024-01-30 DIAGNOSIS — R29.898 LEG HEAVINESS: ICD-10-CM

## 2024-01-30 DIAGNOSIS — R01.1 HEART MURMUR: ICD-10-CM

## 2024-01-30 DIAGNOSIS — I51.89 OTHER ILL-DEFINED HEART DISEASES: ICD-10-CM

## 2024-01-30 DIAGNOSIS — I73.9 CLAUDICATION (CMS-HCC): ICD-10-CM

## 2024-01-30 LAB
ALT SERPL W P-5'-P-CCNC: 15 U/L (ref 7–45)
AST SERPL W P-5'-P-CCNC: 20 U/L (ref 9–39)
CHOLEST SERPL-MCNC: 144 MG/DL (ref 0–199)
CHOLESTEROL/HDL RATIO: 1.9
HDLC SERPL-MCNC: 74 MG/DL
LDLC SERPL CALC-MCNC: 60 MG/DL
NON HDL CHOLESTEROL: 70 MG/DL (ref 0–149)
TRIGL SERPL-MCNC: 52 MG/DL (ref 0–149)
VLDL: 10 MG/DL (ref 0–40)

## 2024-01-30 PROCEDURE — 73110 X-RAY EXAM OF WRIST: CPT | Mod: LT

## 2024-01-30 PROCEDURE — 99215 OFFICE O/P EST HI 40 MIN: CPT | Performed by: NURSE PRACTITIONER

## 2024-01-30 PROCEDURE — 84460 ALANINE AMINO (ALT) (SGPT): CPT

## 2024-01-30 PROCEDURE — 73630 X-RAY EXAM OF FOOT: CPT | Mod: LEFT SIDE | Performed by: RADIOLOGY

## 2024-01-30 PROCEDURE — 73610 X-RAY EXAM OF ANKLE: CPT | Mod: LEFT SIDE | Performed by: RADIOLOGY

## 2024-01-30 PROCEDURE — 93010 ELECTROCARDIOGRAM REPORT: CPT | Performed by: INTERNAL MEDICINE

## 2024-01-30 PROCEDURE — 73502 X-RAY EXAM HIP UNI 2-3 VIEWS: CPT | Mod: LT

## 2024-01-30 PROCEDURE — 80061 LIPID PANEL: CPT

## 2024-01-30 PROCEDURE — 93005 ELECTROCARDIOGRAM TRACING: CPT | Performed by: NURSE PRACTITIONER

## 2024-01-30 PROCEDURE — 73110 X-RAY EXAM OF WRIST: CPT | Mod: LEFT SIDE | Performed by: RADIOLOGY

## 2024-01-30 PROCEDURE — 3074F SYST BP LT 130 MM HG: CPT | Performed by: NURSE PRACTITIONER

## 2024-01-30 PROCEDURE — 3078F DIAST BP <80 MM HG: CPT | Performed by: NURSE PRACTITIONER

## 2024-01-30 PROCEDURE — 1159F MED LIST DOCD IN RCRD: CPT | Performed by: NURSE PRACTITIONER

## 2024-01-30 PROCEDURE — 73564 X-RAY EXAM KNEE 4 OR MORE: CPT | Mod: LT

## 2024-01-30 PROCEDURE — 1126F AMNT PAIN NOTED NONE PRSNT: CPT | Performed by: NURSE PRACTITIONER

## 2024-01-30 PROCEDURE — 84450 TRANSFERASE (AST) (SGOT): CPT

## 2024-01-30 PROCEDURE — 73564 X-RAY EXAM KNEE 4 OR MORE: CPT | Mod: LEFT SIDE | Performed by: RADIOLOGY

## 2024-01-30 PROCEDURE — 73502 X-RAY EXAM HIP UNI 2-3 VIEWS: CPT | Mod: LEFT SIDE | Performed by: RADIOLOGY

## 2024-01-30 PROCEDURE — 36415 COLL VENOUS BLD VENIPUNCTURE: CPT

## 2024-01-30 PROCEDURE — 73610 X-RAY EXAM OF ANKLE: CPT | Mod: LT,FR

## 2024-01-30 PROCEDURE — 1036F TOBACCO NON-USER: CPT | Performed by: NURSE PRACTITIONER

## 2024-01-30 PROCEDURE — 73630 X-RAY EXAM OF FOOT: CPT | Mod: LT,FR

## 2024-01-30 PROCEDURE — 1160F RVW MEDS BY RX/DR IN RCRD: CPT | Performed by: NURSE PRACTITIONER

## 2024-01-30 RX ORDER — REGADENOSON 0.08 MG/ML
0.4 INJECTION, SOLUTION INTRAVENOUS ONCE
Status: CANCELLED | OUTPATIENT
Start: 2024-01-30 | End: 2024-01-30

## 2024-01-30 NOTE — PROGRESS NOTES
Subjective   Emma Oliva is a 78 y.o. female.    Chief Complaint:  Hypertension, Hyperlipidemia, and elevated CACS     Mrs. Oliva presents to establish cardiology care. She comes in today for evaluation of elevated CACS.     Mrs. Oliva is a pleasant 78 year old  female with a past medical history significant for hypertension, hyperlipidemia, diabetes, CKD, depression and elevated CACS 1242 (8/2023). Echocardiogram 2/2017 showed an EF of 65-70% with mild aortic stenosis. Stress-echo in 2017 also showed normal LV systolic function with no evidence of ischemia.     She denies any history of MI, CVA, palpitations, heart murmur, syncope or problematic swelling.     She presents today to establish cardiology care. She was referred for evaluation of elevated CACS 1242 (8/2023). She is accompanied by her supportive daughter. She remains fairly active with ADL's (house work, grocery shopping ect.), denying any exertional chest pain or shortness of breath. She also attends an exercise class which she enjoys. She does complain of leg heaviness and intermittent claudication type pain, that at times can be rather bothersome. She fell the other day, injuring her wrist, though denies any recent ER visits or hospitalizations. She offers no other cardiovascular complaints or concerns today. She denies any complaints of chest pain, shortness of breath, lightheadedness, dizziness, palpitations, syncope, orthopnea, paroxysmal nocturnal dyspnea, lower extremity swelling or bleeding concerns.      Smoking: former- quit in 2000  Alcohol: denies  Illicit drugs: denies   Caffeine: minimal    Social history: Single. Has 3 children. Lives with her daughter    Surgical history: carpal tunnel surgery, bladder surgery, hysterectomy          Review of Systems   All other systems reviewed and are negative.      Objective   Physical Exam  Constitutional:       Appearance: Healthy appearance. In no distress  Pulmonary:      Effort:  Pulmonary effort is normal.      Breath sounds: Normal breath sounds.   Cardiovascular:      Normal rate. Regular rhythm. Normal S1. Normal S2.       Murmurs: There is soft systolic murmur.      Carotids: right carotid pulse +2, no bruit heard over the right carotid. left carotid pulse +2, no bruit heard over the left carotid.  Edema:     Peripheral edema absent.   Abdominal:      Palpations: Abdomen is soft.   Musculoskeletal:       Cervical back: Normal range of motion.   Skin:     General: Skin is warm and dry. Normal color and pigmentation   Neurological:      Mental Status: Alert and oriented to person, place and time.   Psychiatric:     Mood and Affect: appropriate mood and appropriate affect.     EKG obtained and reviewed. sinus rhythm with sinus arrhythmia. HR 77       Lab Review:   Lab Results   Component Value Date     11/13/2023    K 4.5 11/13/2023     11/13/2023    CO2 28 11/13/2023    BUN 32 (H) 11/13/2023    CREATININE 1.47 (H) 11/13/2023    GLUCOSE 76 11/13/2023    CALCIUM 9.3 11/13/2023     Lab Results   Component Value Date    WBC 2.6 (L) 10/05/2022    HGB 12.6 10/05/2022    HCT 39.2 10/05/2022    MCV 98 10/05/2022     10/05/2022     Lab Results   Component Value Date    CHOL 188 10/05/2022    TRIG 85 10/05/2022    HDL 76.4 10/05/2022       Assessment/Plan   Mrs. Oliva is a pleasant 78 year old  female with a past medical history significant for hypertension, hyperlipidemia, diabetes, CKD, depression and elevated CACS 1242 (8/2023). Echocardiogram 2/2017 showed an EF of 65-70% with mild aortic stenosis. Stress-echo in 2017 also showed normal LV systolic function with no evidence of ischemia. She presents today to establish cardiology care. Her VS and EKG remain stable. Her blood pressure is well controlled without any medications at this time. She seems to be getting around reasonably well, denying any exertional chest pain or shortness of breath. I will have her  continue all medications unchanged. I will have her obtain a FLP to see where her cholesterol stands. Given her complaints of dyspnea as well as murmur that was heard on exam today, I will schedule her for an echocardiogram to reassess her LV function and known aortic stenosis. Given her significantly elevated CACS, I will schedule her for a stress test to ensure we are not dealing with any occult ischemia. Lastly, I will have her schedule PVR's due to her complaints of leg heaviness and intermittent claudication type pain. I will call her with her results once available make further recommendations that time. She will follow up with us in clinic in 6-8 weeks. She knows to call for any concerns.

## 2024-01-31 LAB
ATRIAL RATE: 77 BPM
P AXIS: -6 DEGREES
P OFFSET: 202 MS
P ONSET: 144 MS
PR INTERVAL: 158 MS
Q ONSET: 223 MS
QRS COUNT: 12 BEATS
QRS DURATION: 82 MS
QT INTERVAL: 364 MS
QTC CALCULATION(BAZETT): 411 MS
QTC FREDERICIA: 395 MS
R AXIS: -12 DEGREES
T AXIS: 79 DEGREES
T OFFSET: 405 MS
VENTRICULAR RATE: 77 BPM

## 2024-02-16 ENCOUNTER — APPOINTMENT (OUTPATIENT)
Dept: CARDIOLOGY | Facility: HOSPITAL | Age: 79
End: 2024-02-16
Payer: COMMERCIAL

## 2024-02-16 ENCOUNTER — APPOINTMENT (OUTPATIENT)
Dept: VASCULAR MEDICINE | Facility: HOSPITAL | Age: 79
End: 2024-02-16
Payer: COMMERCIAL

## 2024-02-16 ENCOUNTER — APPOINTMENT (OUTPATIENT)
Dept: RADIOLOGY | Facility: HOSPITAL | Age: 79
End: 2024-02-16
Payer: COMMERCIAL

## 2024-02-20 ENCOUNTER — OFFICE VISIT (OUTPATIENT)
Dept: PRIMARY CARE | Facility: CLINIC | Age: 79
End: 2024-02-20
Payer: COMMERCIAL

## 2024-02-20 VITALS — SYSTOLIC BLOOD PRESSURE: 130 MMHG | BODY MASS INDEX: 28.35 KG/M2 | WEIGHT: 155 LBS | DIASTOLIC BLOOD PRESSURE: 79 MMHG

## 2024-02-20 DIAGNOSIS — E11.9 TYPE 2 DIABETES MELLITUS WITHOUT COMPLICATION, UNSPECIFIED WHETHER LONG TERM INSULIN USE (MULTI): ICD-10-CM

## 2024-02-20 DIAGNOSIS — E78.01 FAMILIAL HYPERCHOLESTEREMIA: ICD-10-CM

## 2024-02-20 DIAGNOSIS — E55.9 VITAMIN D DEFICIENCY: ICD-10-CM

## 2024-02-20 DIAGNOSIS — T78.40XS ALLERGY, SEQUELA: ICD-10-CM

## 2024-02-20 DIAGNOSIS — N18.32 STAGE 3B CHRONIC KIDNEY DISEASE (MULTI): ICD-10-CM

## 2024-02-20 DIAGNOSIS — F32.9 MAJOR DEPRESSIVE DISORDER, REMISSION STATUS UNSPECIFIED, UNSPECIFIED WHETHER RECURRENT: ICD-10-CM

## 2024-02-20 PROBLEM — E11.40 CONTROLLED DIABETES MELLITUS WITH DIABETIC NEUROPATHY (MULTI): Status: RESOLVED | Noted: 2023-09-01 | Resolved: 2024-02-20

## 2024-02-20 PROBLEM — G95.9 CERVICAL MYELOPATHY (MULTI): Status: RESOLVED | Noted: 2023-09-01 | Resolved: 2024-02-20

## 2024-02-20 PROBLEM — T78.40XA ALLERGIES: Status: ACTIVE | Noted: 2024-02-20

## 2024-02-20 PROCEDURE — 1159F MED LIST DOCD IN RCRD: CPT | Performed by: INTERNAL MEDICINE

## 2024-02-20 PROCEDURE — G0439 PPPS, SUBSEQ VISIT: HCPCS | Performed by: INTERNAL MEDICINE

## 2024-02-20 PROCEDURE — 1170F FXNL STATUS ASSESSED: CPT | Performed by: INTERNAL MEDICINE

## 2024-02-20 PROCEDURE — 1160F RVW MEDS BY RX/DR IN RCRD: CPT | Performed by: INTERNAL MEDICINE

## 2024-02-20 PROCEDURE — 99214 OFFICE O/P EST MOD 30 MIN: CPT | Performed by: INTERNAL MEDICINE

## 2024-02-20 PROCEDURE — 3078F DIAST BP <80 MM HG: CPT | Performed by: INTERNAL MEDICINE

## 2024-02-20 PROCEDURE — 1036F TOBACCO NON-USER: CPT | Performed by: INTERNAL MEDICINE

## 2024-02-20 PROCEDURE — 1126F AMNT PAIN NOTED NONE PRSNT: CPT | Performed by: INTERNAL MEDICINE

## 2024-02-20 PROCEDURE — 1124F ACP DISCUSS-NO DSCNMKR DOCD: CPT | Performed by: INTERNAL MEDICINE

## 2024-02-20 PROCEDURE — 3075F SYST BP GE 130 - 139MM HG: CPT | Performed by: INTERNAL MEDICINE

## 2024-02-20 RX ORDER — LANOLIN ALCOHOL/MO/W.PET/CERES
1000 CREAM (GRAM) TOPICAL DAILY
Qty: 90 TABLET | Refills: 0 | Status: SHIPPED | OUTPATIENT
Start: 2024-02-20

## 2024-02-20 RX ORDER — CALCITRIOL 0.25 UG/1
0.25 CAPSULE ORAL DAILY
Qty: 90 CAPSULE | Refills: 0 | Status: SHIPPED | OUTPATIENT
Start: 2024-02-20 | End: 2024-04-22

## 2024-02-20 RX ORDER — DOCUSATE SODIUM 100 MG/1
100 CAPSULE, LIQUID FILLED ORAL DAILY
Qty: 60 CAPSULE | Refills: 0 | Status: SHIPPED | OUTPATIENT
Start: 2024-02-20

## 2024-02-20 RX ORDER — MIRABEGRON 25 MG/1
25 TABLET, FILM COATED, EXTENDED RELEASE ORAL DAILY
Qty: 90 TABLET | Refills: 0 | Status: SHIPPED | OUTPATIENT
Start: 2024-02-20 | End: 2024-05-08

## 2024-02-20 RX ORDER — ASPIRIN 81 MG/1
81 TABLET ORAL DAILY
Qty: 90 TABLET | Refills: 0 | Status: SHIPPED | OUTPATIENT
Start: 2024-02-20

## 2024-02-20 RX ORDER — CITALOPRAM 10 MG/1
10 TABLET ORAL DAILY
Qty: 90 TABLET | Refills: 0 | Status: SHIPPED | OUTPATIENT
Start: 2024-02-20 | End: 2024-04-22

## 2024-02-20 RX ORDER — BLOOD SUGAR DIAGNOSTIC
1 STRIP MISCELLANEOUS DAILY
Qty: 300 STRIP | Refills: 2 | Status: SHIPPED | OUTPATIENT
Start: 2024-02-20

## 2024-02-20 RX ORDER — ACETAMINOPHEN 500 MG
2000 TABLET ORAL DAILY
Qty: 90 CAPSULE | Refills: 0 | Status: SHIPPED | OUTPATIENT
Start: 2024-02-20

## 2024-02-20 RX ORDER — CALCIUM CARBONATE 600 MG
1500 TABLET ORAL DAILY
Qty: 90 TABLET | Refills: 0 | Status: SHIPPED | OUTPATIENT
Start: 2024-02-20

## 2024-02-20 RX ORDER — ATORVASTATIN CALCIUM 40 MG/1
40 TABLET, FILM COATED ORAL DAILY
Qty: 30 TABLET | Refills: 5 | Status: SHIPPED | OUTPATIENT
Start: 2024-02-20 | End: 2024-08-18

## 2024-02-20 RX ORDER — FLUTICASONE PROPIONATE 50 MCG
1 SPRAY, SUSPENSION (ML) NASAL 2 TIMES DAILY
Qty: 48 G | Refills: 0 | Status: SHIPPED | OUTPATIENT
Start: 2024-02-20

## 2024-02-20 ASSESSMENT — ACTIVITIES OF DAILY LIVING (ADL)
BATHING: INDEPENDENT
DRESSING: INDEPENDENT
MANAGING_FINANCES: INDEPENDENT
DOING_HOUSEWORK: INDEPENDENT
GROCERY_SHOPPING: INDEPENDENT
TAKING_MEDICATION: INDEPENDENT

## 2024-02-20 ASSESSMENT — PATIENT HEALTH QUESTIONNAIRE - PHQ9
SUM OF ALL RESPONSES TO PHQ9 QUESTIONS 1 AND 2: 1
10. IF YOU CHECKED OFF ANY PROBLEMS, HOW DIFFICULT HAVE THESE PROBLEMS MADE IT FOR YOU TO DO YOUR WORK, TAKE CARE OF THINGS AT HOME, OR GET ALONG WITH OTHER PEOPLE: NOT DIFFICULT AT ALL
2. FEELING DOWN, DEPRESSED OR HOPELESS: SEVERAL DAYS
1. LITTLE INTEREST OR PLEASURE IN DOING THINGS: NOT AT ALL

## 2024-02-20 NOTE — ASSESSMENT & PLAN NOTE
>>ASSESSMENT AND PLAN FOR STAGE 3B CHRONIC KIDNEY DISEASE (MULTI) WRITTEN ON 2/20/2024  1:53 PM BY SHUKRI KELLER MD    Stable  Repeat levels

## 2024-02-20 NOTE — PROGRESS NOTES
Medicare Wellness Billing Compliance Satisfied    *This is a visual tool to show completion of required items on the day of the visit. Green checks will only appear on the date of visit.    Review all medications by prescribing practitioner or clinical pharmacist (such as prescriptions, OTCs, herbal therapies and supplements) documented in the medical record    Past Medical, Surgical, and Family History reviewed and updated in chart    Tobacco Use Reviewed    Alcohol Use Reviewed    Illicit Drug Use Reviewed    PHQ2/9    Falls in Last Year Reviewed    Home Safety Risk Factors Reviewed    Cognitive Impairment Reviewed    Patient Self Assessment and Health Status    Current Diet Reviewed    Exercise Frequency    ADL - Hearing Impairment    ADL - Bathing    ADL - Dressing    ADL - Walks in Home    IADL - Managing Finances    IADL - Grocery Shopping    IADL - Taking Medications    IADL - Doing Housework        Chief Complaint:   Chief Complaint   Patient presents with    Follow-up    Medicare Annual Wellness Visit Subsequent    Med Refill      Subjective     HPI    78 y.o. female with a PMH significant for HTN, HLD, well controlled diabetes, CKD stage 3, vaginal prolapse, constipation, and depression presents to the clinic for wellness and medication refill.  Overall she is doing well, blood pressure stable at 130/79 with a stable weight.  She reports eating a healthy balanced diet and going to the gym frequently as well as leading him an active lifestyle.  No known concerns or complaints at this time.  Review of systems otherwise negative.        Objective    Visit Vitals  /79      BMI Readings from Last 15 Encounters:   02/20/24 28.35 kg/m²   01/30/24 28.46 kg/m²   11/13/23 27.98 kg/m²   10/10/23 28.35 kg/m²   08/07/23 30.46 kg/m²   04/04/23 26.99 kg/m²   03/07/23 26.26 kg/m²   02/28/23 26.43 kg/m²   01/10/23 26.61 kg/m²   11/07/22 26.61 kg/m²   10/05/22 27.98 kg/m²   06/17/22 26.43 kg/m²    06/07/22 26.26 kg/m²   03/08/22 28.67 kg/m²   12/07/21 27.12 kg/m²      Lab Results   Component Value Date    HGBA1C 5.6 11/13/2023      Lab Results   Component Value Date    HGBA1C 5.6 11/13/2023    HGBA1C 5.8 (A) 04/11/2023     Lab Results   Component Value Date    LDLCALC 60 01/30/2024    CREATININE 1.47 (H) 11/13/2023      Lab Results   Component Value Date    WBC 2.6 (L) 10/05/2022    HGB 12.6 10/05/2022    HCT 39.2 10/05/2022    MCV 98 10/05/2022     10/05/2022        Chemistry    Lab Results   Component Value Date/Time     11/13/2023 1408    K 4.5 11/13/2023 1408     11/13/2023 1408    CO2 28 11/13/2023 1408    BUN 32 (H) 11/13/2023 1408    CREATININE 1.47 (H) 11/13/2023 1408    Lab Results   Component Value Date/Time    CALCIUM 9.3 11/13/2023 1408    ALKPHOS 76 11/13/2023 1408    AST 20 01/30/2024 1456    ALT 15 01/30/2024 1456    BILITOT 0.4 11/13/2023 1408           No images are attached to the encounter.   [unfilled]       Physical Exam   Gen: well appearing, in NAD  HEENT: AT/NC, no conjunctival pallor, anicteric sclera, EOMI   Neck: supple, no LAD/JVD  Chest: Good air entry b/l, no adventitious sounds heard  CVS: s1 & S2 only, no MGR  Abd: soft, flat, NT/ND, BS+  Ext: no cyanosis/clubbing or pedal edema  Neuro: Aox3, cn 2-12 intact, motor 5/5 globally, sensation intact    Assessment/Plan    The following medical issues were discussed during this encounter  :     #Prehypertension  -Well-controlled with diet and exercise alone    #Hyperlipidemia  -Continue atorvastatin 40 mg daily    #Urinary urgency  -She reports a vast improvement with mirabegron 25 mg daily    #High cardiac CT score  -She has a follow-up appointment with cardiology with an echo to reassess her left ventricular function and aortic stenosis      Immunizations: UTD        Please return in 4 months or if symptoms worsen     Jeet Calderon MD

## 2024-02-20 NOTE — PROGRESS NOTES
I saw and evaluated the patient. I personally obtained the key and critical portions of the history and physical exam or was physically present for key and critical portions performed by the resident/fellow. I reviewed the resident/fellow's documentation and discussed the patient with the resident/fellow. I agree with the resident/fellow's medical decision making as documented in the note.    Natalee iEd MD

## 2024-02-23 ENCOUNTER — HOSPITAL ENCOUNTER (OUTPATIENT)
Dept: RADIOLOGY | Facility: HOSPITAL | Age: 79
Discharge: HOME | End: 2024-02-23
Payer: COMMERCIAL

## 2024-02-23 ENCOUNTER — HOSPITAL ENCOUNTER (OUTPATIENT)
Dept: VASCULAR MEDICINE | Facility: HOSPITAL | Age: 79
Discharge: HOME | End: 2024-02-23
Payer: COMMERCIAL

## 2024-02-23 ENCOUNTER — HOSPITAL ENCOUNTER (OUTPATIENT)
Dept: CARDIOLOGY | Facility: HOSPITAL | Age: 79
Discharge: HOME | End: 2024-02-23
Payer: COMMERCIAL

## 2024-02-23 DIAGNOSIS — R29.898 LEG HEAVINESS: ICD-10-CM

## 2024-02-23 DIAGNOSIS — I10 BENIGN ESSENTIAL HYPERTENSION: ICD-10-CM

## 2024-02-23 DIAGNOSIS — R93.1 ABNORMAL HEART SCORE CT: ICD-10-CM

## 2024-02-23 DIAGNOSIS — I51.89 OTHER ILL-DEFINED HEART DISEASES: ICD-10-CM

## 2024-02-23 DIAGNOSIS — E78.01 FAMILIAL HYPERCHOLESTEREMIA: ICD-10-CM

## 2024-02-23 DIAGNOSIS — I73.9 CLAUDICATION (CMS-HCC): ICD-10-CM

## 2024-02-23 DIAGNOSIS — M79.89 LEG SWELLING: ICD-10-CM

## 2024-02-23 PROCEDURE — 2500000004 HC RX 250 GENERAL PHARMACY W/ HCPCS (ALT 636 FOR OP/ED): Performed by: NURSE PRACTITIONER

## 2024-02-23 PROCEDURE — A9502 TC99M TETROFOSMIN: HCPCS | Performed by: NURSE PRACTITIONER

## 2024-02-23 PROCEDURE — 78452 HT MUSCLE IMAGE SPECT MULT: CPT

## 2024-02-23 PROCEDURE — 3430000001 HC RX 343 DIAGNOSTIC RADIOPHARMACEUTICALS: Performed by: NURSE PRACTITIONER

## 2024-02-23 PROCEDURE — 93017 CV STRESS TEST TRACING ONLY: CPT

## 2024-02-23 PROCEDURE — 93016 CV STRESS TEST SUPVJ ONLY: CPT | Performed by: INTERNAL MEDICINE

## 2024-02-23 RX ORDER — REGADENOSON 0.08 MG/ML
0.4 INJECTION, SOLUTION INTRAVENOUS ONCE
Status: COMPLETED | OUTPATIENT
Start: 2024-02-23 | End: 2024-02-23

## 2024-02-23 RX ADMIN — TETROFOSMIN 10.4 MILLICURIE: 0.23 INJECTION, POWDER, LYOPHILIZED, FOR SOLUTION INTRAVENOUS at 07:34

## 2024-02-23 RX ADMIN — TETROFOSMIN 35.5 MILLICURIE: 0.23 INJECTION, POWDER, LYOPHILIZED, FOR SOLUTION INTRAVENOUS at 08:55

## 2024-02-23 RX ADMIN — REGADENOSON 0.4 MG: 0.08 INJECTION, SOLUTION INTRAVENOUS at 08:50

## 2024-03-08 ENCOUNTER — OFFICE VISIT (OUTPATIENT)
Dept: CARDIOLOGY | Facility: HOSPITAL | Age: 79
End: 2024-03-08
Payer: COMMERCIAL

## 2024-03-08 VITALS
BODY MASS INDEX: 28.89 KG/M2 | HEIGHT: 62 IN | WEIGHT: 157 LBS | OXYGEN SATURATION: 100 % | HEART RATE: 84 BPM | SYSTOLIC BLOOD PRESSURE: 120 MMHG | DIASTOLIC BLOOD PRESSURE: 66 MMHG

## 2024-03-08 DIAGNOSIS — I10 BENIGN ESSENTIAL HYPERTENSION: Primary | ICD-10-CM

## 2024-03-08 DIAGNOSIS — R93.1 ABNORMAL HEART SCORE CT: ICD-10-CM

## 2024-03-08 DIAGNOSIS — E78.01 FAMILIAL HYPERCHOLESTEREMIA: ICD-10-CM

## 2024-03-08 PROCEDURE — 1126F AMNT PAIN NOTED NONE PRSNT: CPT | Performed by: NURSE PRACTITIONER

## 2024-03-08 PROCEDURE — 3078F DIAST BP <80 MM HG: CPT | Performed by: NURSE PRACTITIONER

## 2024-03-08 PROCEDURE — 3074F SYST BP LT 130 MM HG: CPT | Performed by: NURSE PRACTITIONER

## 2024-03-08 PROCEDURE — 99214 OFFICE O/P EST MOD 30 MIN: CPT | Performed by: NURSE PRACTITIONER

## 2024-03-08 PROCEDURE — 1036F TOBACCO NON-USER: CPT | Performed by: NURSE PRACTITIONER

## 2024-03-08 PROCEDURE — 1160F RVW MEDS BY RX/DR IN RCRD: CPT | Performed by: NURSE PRACTITIONER

## 2024-03-08 PROCEDURE — 1159F MED LIST DOCD IN RCRD: CPT | Performed by: NURSE PRACTITIONER

## 2024-03-08 ASSESSMENT — ENCOUNTER SYMPTOMS: HYPERTENSION: 1

## 2024-03-08 NOTE — PROGRESS NOTES
Subjective   Emma Oliva is a 78 y.o. female.    Chief Complaint:  Hypertension and Hyperlipidemia    Mrs. Oliva returns for a 5 week follow up. She has been feeling fairly well from a cardiac standpoint. She has remained compliant with her medications, denying any intolerances. She had her stress test, though unfortunately did not get the echocardiogram or PVR's completed. It is unclear if there was a scheduling mix up, and why the tests weren't completed since they were all scheduled for the same day. She continues to struggle with some dyspnea, and intermittent claudication pain. She offers no other complaints or concerns today. She denies any complaints of chest pain, lightheadedness, dizziness, palpitations, syncope, orthopnea, paroxysmal nocturnal dyspnea, lower extremity swelling or bleeding concerns.      Hypertension    Hyperlipidemia        Review of Systems   All other systems reviewed and are negative.      Objective   Physical Exam  Constitutional:       Appearance: Healthy appearance. In no distress  Pulmonary:      Effort: Pulmonary effort is normal.      Breath sounds: Normal breath sounds.   Cardiovascular:      Normal rate. Regular rhythm. Normal S1. Normal S2.       Murmurs: There is soft systolic murmur.      Carotids: right carotid pulse +2, no bruit heard over the right carotid. left carotid pulse +2, no bruit heard over the left carotid.  Edema:     Peripheral edema absent.   Abdominal:      Palpations: Abdomen is soft.   Musculoskeletal:       Cervical back: Normal range of motion.   Skin:     General: Skin is warm and dry. Normal color and pigmentation   Neurological:      Mental Status: Alert and oriented to person, place and time.   Psychiatric:     Mood and Affect: appropriate mood and appropriate affect.       Lab Review:   Lab Results   Component Value Date     11/13/2023    K 4.5 11/13/2023     11/13/2023    CO2 28 11/13/2023    BUN 32 (H) 11/13/2023    CREATININE 1.47 (H)  11/13/2023    GLUCOSE 76 11/13/2023    CALCIUM 9.3 11/13/2023     Lab Results   Component Value Date    WBC 2.6 (L) 10/05/2022    HGB 12.6 10/05/2022    HCT 39.2 10/05/2022    MCV 98 10/05/2022     10/05/2022     Lab Results   Component Value Date    CHOL 144 01/30/2024    TRIG 52 01/30/2024    HDL 74.0 01/30/2024       Assessment/Plan   Mrs. Oliva is a pleasant 78 year old  female with a past medical history significant for hypertension, hyperlipidemia, diabetes, CKD, depression and elevated CACS 1242 (8/2023). NM stress test 2/2024 showed no evidence of ischemia or prior infarction with normal LV function. She presents today for routine follow up stable from a cardiac standpoint. Her VS and EKG remain stable. She remains on appropriate antiplatelet and lipid lowering therapy with her LDL at goal. I will have her continue all medications unchanged. I will have her reschedule both the echocardiogram and PVR's. I will call her with her results once available and make further recommendations at that time. She will follow up with me in clinic in 6 months. She knows to call for any concerns.

## 2024-03-27 ENCOUNTER — APPOINTMENT (OUTPATIENT)
Dept: VASCULAR MEDICINE | Facility: HOSPITAL | Age: 79
End: 2024-03-27
Payer: COMMERCIAL

## 2024-03-27 ENCOUNTER — APPOINTMENT (OUTPATIENT)
Dept: CARDIOLOGY | Facility: HOSPITAL | Age: 79
End: 2024-03-27
Payer: COMMERCIAL

## 2024-04-09 ENCOUNTER — APPOINTMENT (OUTPATIENT)
Dept: OBSTETRICS AND GYNECOLOGY | Facility: CLINIC | Age: 79
End: 2024-04-09
Payer: COMMERCIAL

## 2024-04-22 DIAGNOSIS — E55.9 VITAMIN D DEFICIENCY: ICD-10-CM

## 2024-04-22 DIAGNOSIS — F32.9 MAJOR DEPRESSIVE DISORDER, REMISSION STATUS UNSPECIFIED, UNSPECIFIED WHETHER RECURRENT: ICD-10-CM

## 2024-04-22 RX ORDER — CALCITRIOL 0.25 UG/1
0.25 CAPSULE ORAL DAILY
Qty: 90 CAPSULE | Refills: 0 | Status: SHIPPED | OUTPATIENT
Start: 2024-04-22

## 2024-04-22 RX ORDER — CITALOPRAM 10 MG/1
10 TABLET ORAL DAILY
Qty: 90 TABLET | Refills: 0 | Status: SHIPPED | OUTPATIENT
Start: 2024-04-22

## 2024-05-08 DIAGNOSIS — E78.01 FAMILIAL HYPERCHOLESTEREMIA: ICD-10-CM

## 2024-05-08 DIAGNOSIS — F32.9 MAJOR DEPRESSIVE DISORDER, REMISSION STATUS UNSPECIFIED, UNSPECIFIED WHETHER RECURRENT: ICD-10-CM

## 2024-05-08 DIAGNOSIS — E55.9 VITAMIN D DEFICIENCY: ICD-10-CM

## 2024-05-08 DIAGNOSIS — E11.9 TYPE 2 DIABETES MELLITUS WITHOUT COMPLICATION, UNSPECIFIED WHETHER LONG TERM INSULIN USE (MULTI): ICD-10-CM

## 2024-05-08 DIAGNOSIS — T78.40XS ALLERGY, SEQUELA: ICD-10-CM

## 2024-05-08 RX ORDER — MIRABEGRON 25 MG/1
25 TABLET, FILM COATED, EXTENDED RELEASE ORAL DAILY
Qty: 90 TABLET | Refills: 3 | Status: SHIPPED | OUTPATIENT
Start: 2024-05-08

## 2024-06-18 ENCOUNTER — HOSPITAL ENCOUNTER (OUTPATIENT)
Dept: VASCULAR MEDICINE | Facility: HOSPITAL | Age: 79
Discharge: HOME | End: 2024-06-18
Payer: COMMERCIAL

## 2024-06-18 ENCOUNTER — APPOINTMENT (OUTPATIENT)
Dept: PRIMARY CARE | Facility: CLINIC | Age: 79
End: 2024-06-18
Payer: COMMERCIAL

## 2024-06-18 VITALS
HEIGHT: 62 IN | DIASTOLIC BLOOD PRESSURE: 60 MMHG | WEIGHT: 155 LBS | BODY MASS INDEX: 28.52 KG/M2 | SYSTOLIC BLOOD PRESSURE: 130 MMHG

## 2024-06-18 DIAGNOSIS — R73.03 PREDIABETES: ICD-10-CM

## 2024-06-18 DIAGNOSIS — M79.605 PAIN IN LEFT LEG: ICD-10-CM

## 2024-06-18 DIAGNOSIS — I87.8 VENOUS STASIS SYNDROME: ICD-10-CM

## 2024-06-18 DIAGNOSIS — T78.40XS ALLERGY, SEQUELA: ICD-10-CM

## 2024-06-18 DIAGNOSIS — M89.9 DISORDER OF BONE, UNSPECIFIED: ICD-10-CM

## 2024-06-18 DIAGNOSIS — M79.604 PAIN IN RIGHT LEG: ICD-10-CM

## 2024-06-18 DIAGNOSIS — E11.9 TYPE 2 DIABETES MELLITUS WITHOUT COMPLICATION, UNSPECIFIED WHETHER LONG TERM INSULIN USE (MULTI): ICD-10-CM

## 2024-06-18 DIAGNOSIS — R60.0 LOWER EXTREMITY EDEMA: Primary | ICD-10-CM

## 2024-06-18 DIAGNOSIS — R06.09 OTHER FORMS OF DYSPNEA: ICD-10-CM

## 2024-06-18 DIAGNOSIS — E78.01 FAMILIAL HYPERCHOLESTEREMIA: ICD-10-CM

## 2024-06-18 DIAGNOSIS — I10 HYPERTENSION, UNSPECIFIED TYPE: ICD-10-CM

## 2024-06-18 DIAGNOSIS — E55.9 VITAMIN D DEFICIENCY: ICD-10-CM

## 2024-06-18 DIAGNOSIS — F32.9 MAJOR DEPRESSIVE DISORDER, REMISSION STATUS UNSPECIFIED, UNSPECIFIED WHETHER RECURRENT: ICD-10-CM

## 2024-06-18 DIAGNOSIS — I73.9 PAD (PERIPHERAL ARTERY DISEASE) (CMS-HCC): ICD-10-CM

## 2024-06-18 DIAGNOSIS — R60.0 LOCALIZED EDEMA: ICD-10-CM

## 2024-06-18 PROCEDURE — 3075F SYST BP GE 130 - 139MM HG: CPT | Performed by: INTERNAL MEDICINE

## 2024-06-18 PROCEDURE — 1036F TOBACCO NON-USER: CPT | Performed by: INTERNAL MEDICINE

## 2024-06-18 PROCEDURE — 93970 EXTREMITY STUDY: CPT | Performed by: SURGERY

## 2024-06-18 PROCEDURE — 3078F DIAST BP <80 MM HG: CPT | Performed by: INTERNAL MEDICINE

## 2024-06-18 PROCEDURE — 99214 OFFICE O/P EST MOD 30 MIN: CPT | Performed by: INTERNAL MEDICINE

## 2024-06-18 PROCEDURE — 93970 EXTREMITY STUDY: CPT

## 2024-06-18 RX ORDER — CALCITRIOL 0.25 UG/1
0.25 CAPSULE ORAL DAILY
Qty: 90 CAPSULE | Refills: 1 | Status: SHIPPED | OUTPATIENT
Start: 2024-06-18 | End: 2024-06-18 | Stop reason: SDUPTHER

## 2024-06-18 RX ORDER — CALCITRIOL 0.25 UG/1
0.25 CAPSULE ORAL DAILY
Qty: 90 CAPSULE | Refills: 1 | Status: SHIPPED | OUTPATIENT
Start: 2024-06-18

## 2024-06-18 RX ORDER — FLUTICASONE PROPIONATE 50 MCG
1 SPRAY, SUSPENSION (ML) NASAL 2 TIMES DAILY
Qty: 48 G | Refills: 1 | Status: SHIPPED | OUTPATIENT
Start: 2024-06-18

## 2024-06-18 RX ORDER — CITALOPRAM 10 MG/1
10 TABLET ORAL DAILY
Qty: 90 TABLET | Refills: 1 | Status: SHIPPED | OUTPATIENT
Start: 2024-06-18 | End: 2024-06-18 | Stop reason: SDUPTHER

## 2024-06-18 RX ORDER — CITALOPRAM 10 MG/1
10 TABLET ORAL DAILY
Qty: 90 TABLET | Refills: 1 | Status: SHIPPED | OUTPATIENT
Start: 2024-06-18

## 2024-06-18 RX ORDER — ATORVASTATIN CALCIUM 40 MG/1
40 TABLET, FILM COATED ORAL DAILY
Qty: 90 TABLET | Refills: 1 | Status: SHIPPED | OUTPATIENT
Start: 2024-06-18 | End: 2024-06-18 | Stop reason: SDUPTHER

## 2024-06-18 RX ORDER — ATORVASTATIN CALCIUM 40 MG/1
40 TABLET, FILM COATED ORAL DAILY
Qty: 90 TABLET | Refills: 1 | Status: SHIPPED | OUTPATIENT
Start: 2024-06-18 | End: 2024-12-15

## 2024-06-18 RX ORDER — FLUTICASONE PROPIONATE 50 MCG
1 SPRAY, SUSPENSION (ML) NASAL 2 TIMES DAILY
Qty: 48 G | Refills: 1 | Status: SHIPPED | OUTPATIENT
Start: 2024-06-18 | End: 2024-06-18 | Stop reason: SDUPTHER

## 2024-06-18 ASSESSMENT — ENCOUNTER SYMPTOMS
JOINT SWELLING: 1
ARTHRALGIAS: 1

## 2024-06-18 ASSESSMENT — PATIENT HEALTH QUESTIONNAIRE - PHQ9
1. LITTLE INTEREST OR PLEASURE IN DOING THINGS: NOT AT ALL
SUM OF ALL RESPONSES TO PHQ9 QUESTIONS 1 AND 2: 0
2. FEELING DOWN, DEPRESSED OR HOPELESS: NOT AT ALL

## 2024-06-18 ASSESSMENT — LIFESTYLE VARIABLES: HOW MANY STANDARD DRINKS CONTAINING ALCOHOL DO YOU HAVE ON A TYPICAL DAY: PATIENT DOES NOT DRINK

## 2024-06-18 NOTE — PROGRESS NOTES
I reviewed the resident/fellow's documentation and discussed the patient with the resident/fellow. I agree with the resident/fellow's medical decision making as documented in the note.  As the attending physician, I certify that I personally reviewed the patient's history and personally examined the patient to confirm the physical findings described above, and that I reviewed the relevant imaging studies and available reports. I also discussed the differential diagnosis and all of the proposed management plans with the patient and individuals accompanying the patient to this visit. They had the opportunity to ask questions about the proposed management plans and to have those questions answered.     Natalee Eid MD

## 2024-06-18 NOTE — PROGRESS NOTES
Chief Complaint:   Chief Complaint   Patient presents with    Follow-up      HPI    Emma Oliva is a 79 y.o. year old female who presents to the clinic for Bilateral LE swelling and pain more in the Left side, patient have been dealing with this since January and was referred by her cardiologist for PVR studies and ECHO but it was not done by the department at that time, her symptoms gotten worsen now and her L leg is remarkably bothersome, she denied recent trauma, SOB, CP or change in temp or loss in sensation. Will send the patient to have STAT duplex to r/o DVT and Duplex arterial studies.     Past Medical History   Past Medical History:   Diagnosis Date    Body mass index (BMI) 27.0-27.9, adult 12/13/2019    BMI 27.0-27.9,adult    Body mass index (BMI) 27.0-27.9, adult 08/07/2020    Body mass index (BMI) of 27.0 to 27.9 in adult    Body mass index (BMI) 29.0-29.9, adult 02/07/2020    BMI 29.0-29.9,adult    Encounter for gynecological examination (general) (routine) without abnormal findings     Encounter for routine pelvic examination    Encounter for immunization 09/14/2020    Need for prophylactic vaccination and inoculation against influenza    Encounter for screening mammogram for malignant neoplasm of breast 09/05/2014    Visit for screening mammogram    Encounter for screening mammogram for malignant neoplasm of breast 09/29/2015    Visit for screening mammogram    Personal history of diseases of the blood and blood-forming organs and certain disorders involving the immune mechanism     History of bleeding disorder    Personal history of other diseases of the musculoskeletal system and connective tissue     History of arthritis    Personal history of other diseases of the nervous system and sense organs     History of cataract    Personal history of other diseases of urinary system     History of bladder problems    Personal history of other medical treatment 02/19/2021    History of screening mammography     Type 2 diabetes mellitus without complications (Multi) 10/05/2022    Diabetes mellitus      Past Surgical History:   Past Surgical History:   Procedure Laterality Date    BLADDER SURGERY  10/23/2015    Bladder Surgery    BREAST SURGERY  10/23/2015    Breast Surgery    CATARACT EXTRACTION  10/23/2015    Cataract Surgery    COLON SURGERY  10/23/2015    Colon Surgery    HYSTERECTOMY  10/23/2015    Hysterectomy    OTHER SURGICAL HISTORY  10/23/2015    Excision Of The Leg     Family History:   Family History   Problem Relation Name Age of Onset    Other (coma) Other      Heart attack Other      Kidney failure Other       Social History:   Tobacco Use: Medium Risk (3/8/2024)    Patient History     Smoking Tobacco Use: Former     Smokeless Tobacco Use: Never     Passive Exposure: Not on file      Social History     Substance and Sexual Activity   Alcohol Use Never        Allergies:   No Known Allergies     ROS   Review of Systems   Cardiovascular:  Positive for leg swelling.   Musculoskeletal:  Positive for arthralgias and joint swelling.   All other systems reviewed and are negative.       Objective   Vitals:    06/18/24 1008   BP: 130/60      BMI Readings from Last 15 Encounters:   06/18/24 28.35 kg/m²   03/08/24 28.72 kg/m²   02/20/24 28.35 kg/m²   01/30/24 28.46 kg/m²   11/13/23 27.98 kg/m²   10/10/23 28.35 kg/m²   08/07/23 30.46 kg/m²   04/04/23 26.99 kg/m²   03/07/23 26.26 kg/m²   02/28/23 26.43 kg/m²   01/10/23 26.61 kg/m²   11/07/22 26.61 kg/m²   10/05/22 27.98 kg/m²   06/17/22 26.43 kg/m²   06/07/22 26.26 kg/m²      70.3 kg (155 lb) (6/18/2024 10:08 AM)      Physical Exam  Physical Exam  Vitals and nursing note reviewed.   Constitutional:       Appearance: Normal appearance. She is normal weight.   HENT:      Head: Normocephalic and atraumatic.      Right Ear: Tympanic membrane normal.      Nose: Nose normal.      Mouth/Throat:      Mouth: Mucous membranes are moist.   Eyes:      Extraocular Movements:  "Extraocular movements intact.   Cardiovascular:      Rate and Rhythm: Normal rate.      Pulses: Normal pulses.      Heart sounds: Normal heart sounds.   Pulmonary:      Effort: Pulmonary effort is normal.      Breath sounds: Normal breath sounds.   Abdominal:      General: Abdomen is flat. Bowel sounds are normal.      Palpations: Abdomen is soft.   Musculoskeletal:         General: Swelling present. Normal range of motion.      Right lower leg: Edema present.      Left lower leg: Edema present.   Skin:     General: Skin is warm and dry.      Capillary Refill: Capillary refill takes less than 2 seconds.   Neurological:      General: No focal deficit present.      Mental Status: She is alert and oriented to person, place, and time.   Psychiatric:         Mood and Affect: Mood normal.         Behavior: Behavior normal.         Thought Content: Thought content normal.         Judgment: Judgment normal.          Labs:  CBC:  Recent Labs     10/05/22  1434 03/08/22  1306 12/13/21  1346   WBC 2.6* 3.0* 3.6*   HGB 12.6 11.5* 12.1   HCT 39.2 34.1* 38.2    168 201   MCV 98 92 97     CMP:  Recent Labs     11/13/23  1408 04/11/23  1255 10/05/22  1434    141 140   K 4.5 4.5 4.1    103 102   CO2 28 30 27   ANIONGAP 14 13 15   BUN 32* 37* 31*   CREATININE 1.47* 1.50* 1.38*   EGFR 36*  --   --    GLUCOSE 76 83 69*     Recent Labs     01/30/24  1456 11/13/23  1408 04/11/23  1255 10/05/22  1434   ALBUMIN  --  4.3 4.0 4.2   ALKPHOS  --  76 76 73   ALT 15 18 15 16   AST 20 22 20 22   BILITOT  --  0.4 0.3 0.5     Calcium/Phos:   Lab Results   Component Value Date    CALCIUM 9.3 11/13/2023    PHOS 3.1 03/08/2022      COAG:   Recent Labs     08/26/20  1045   INR 0.9     CRP: No results found for: \"CRP\"   [unfilled]   ENDO:  Recent Labs     11/13/23  1408 04/11/23  1255 10/05/22  1434 09/07/21  1040 11/08/19  1034   TSH  --   --  2.12 2.50 1.62   HGBA1C 5.6 5.8*  --   --   --       CARDIAC: No results for input(s): " "\"LDH\", \"CKMB\", \"TROPHS\", \"BNP\" in the last 84583 hours.    No lab exists for component: \"CK\", \"CKMBP\"  Recent Labs     01/30/24  1456 10/05/22  1434   CHOL 144 188   LDLF  --  95   LDLCALC 60  --    HDL 74.0 76.4   TRIG 52 85     No data recorded    Current Medications:  Current Outpatient Medications   Medication Sig Dispense Refill    alpha lipoic acid 200 mg capsule Take 1 capsule (200 mg) by mouth once daily.      aspirin 81 mg EC tablet Take 1 tablet (81 mg) by mouth once daily. 90 tablet 0    atorvastatin (Lipitor) 40 mg tablet Take 1 tablet (40 mg) by mouth once daily. 90 tablet 1    blood sugar diagnostic (Accu-Chek Rylie Plus test strp) strip 1 strip once daily. 300 strip 2    calcitriol (Rocaltrol) 0.25 mcg capsule Take 1 capsule (0.25 mcg) by mouth once daily. 90 capsule 1    calcium carbonate 600 mg calcium (1,500 mg) tablet Take 1 tablet (1,500 mg) by mouth once daily. 90 tablet 0    cholecalciferol (Vitamin D-3) 50 mcg (2,000 unit) capsule Take 1 capsule (50 mcg) by mouth early in the morning.. 90 capsule 0    citalopram (CeleXA) 10 mg tablet Take 1 tablet (10 mg) by mouth once daily. 90 tablet 1    cyanocobalamin (Vitamin B-12) 1,000 mcg tablet Take 1 tablet (1,000 mcg) by mouth once daily. 90 tablet 0    docusate sodium (Colace) 100 mg capsule Take 1 capsule (100 mg) by mouth once daily. 60 capsule 0    fish oil concentrate (Omega-3) 120-180 mg capsule Take 1 capsule (1 g) by mouth once daily.      fluticasone (Flonase) 50 mcg/actuation nasal spray Administer 1 spray into each nostril 2 times a day. 48 g 1    magnesium 250 mg tablet Take 1 tablet (250 mg) by mouth once daily.      multivitamin (MULTIPLE VITAMINS ORAL) Take 1 tablet by mouth once daily.      Myrbetriq 25 mg tablet extended release 24 hr 24 hr tablet TAKE 1 TABLET BY MOUTH ONCE  DAILY 90 tablet 3    polyethylene glycol (Glycolax, Miralax) 17 gram/dose powder Take by mouth once daily.      polyvinyl alcohol (Artificial Tears, polyvin " alc,) 1.4 % ophthalmic solution        No current facility-administered medications for this visit.       Assessment and Plan  Emma was seen today for follow-up.  Diagnoses and all orders for this visit:  Familial hypercholesteremia (Primary)  -     CBC; Future  -     Comprehensive Metabolic Panel; Future  -     Lipid Panel; Future  -     Discontinue: atorvastatin (Lipitor) 40 mg tablet; Take 1 tablet (40 mg) by mouth once daily.  -     atorvastatin (Lipitor) 40 mg tablet; Take 1 tablet (40 mg) by mouth once daily.  Type 2 diabetes mellitus without complication, unspecified whether long term insulin use (Multi)  -     CBC; Future  -     Comprehensive Metabolic Panel; Future  -     Hemoglobin A1C; Future  -     Lactate; Future  Vitamin D deficiency  -     CBC; Future  -     Comprehensive Metabolic Panel; Future  -     Discontinue: calcitriol (Rocaltrol) 0.25 mcg capsule; Take 1 capsule (0.25 mcg) by mouth once daily.  -     calcitriol (Rocaltrol) 0.25 mcg capsule; Take 1 capsule (0.25 mcg) by mouth once daily.  Major depressive disorder, remission status unspecified, unspecified whether recurrent  -     CBC; Future  -     Comprehensive Metabolic Panel; Future  -     Lactate; Future  -     Albumin, urine, random; Future  -     Discontinue: citalopram (CeleXA) 10 mg tablet; Take 1 tablet (10 mg) by mouth once daily.  -     citalopram (CeleXA) 10 mg tablet; Take 1 tablet (10 mg) by mouth once daily.  Hypertension, unspecified type  -     CBC; Future  -     Comprehensive Metabolic Panel; Future  -     TSH with reflex to Free T4 if abnormal; Future  -     Albumin, urine, random; Future  Prediabetes  -     CBC; Future  -     Comprehensive Metabolic Panel; Future  -     Hemoglobin A1C; Future  -     Lipid Panel; Future  Lower extremity edema  -     CBC; Future  -     Comprehensive Metabolic Panel; Future  PAD (peripheral artery disease) (CMS-HCC)  -     CBC; Future  -     Comprehensive Metabolic Panel; Future  -      Coagulation Screen; Future  -     Lipid Panel; Future  -     B-type natriuretic peptide; Future  -     Vitamin D 25-Hydroxy,Total (for eval of Vitamin D levels); Future  -     Vascular US lower extremity arterial duplex bilateral with RHONA; Future  -     Vascular US lower extremity venous duplex bilateral; Future  -     Referral to Vascular Medicine; Future  Venous stasis syndrome  -     CBC; Future  -     Comprehensive Metabolic Panel; Future  -     Coagulation Screen; Future  -     Hemoglobin A1C; Future  -     Lipid Panel; Future  -     TSH with reflex to Free T4 if abnormal; Future  -     Lactate; Future  -     B-type natriuretic peptide; Future  -     Vascular US lower extremity arterial duplex bilateral with RHONA; Future  -     Vascular US lower extremity venous duplex bilateral; Future  -     Referral to Vascular Medicine; Future  Disorder of bone, unspecified  -     Vitamin D 25-Hydroxy,Total (for eval of Vitamin D levels); Future  Pain in left leg  -     Vascular US lower extremity venous duplex bilateral; Future  Other forms of dyspnea  -     B-type natriuretic peptide; Future  Allergy, sequela  -     Discontinue: fluticasone (Flonase) 50 mcg/actuation nasal spray; Administer 1 spray into each nostril 2 times a day.  -     fluticasone (Flonase) 50 mcg/actuation nasal spray; Administer 1 spray into each nostril 2 times a day.       Emma Oliva is a 79 y.o. year old female who presents to the clinic for Bilateral LE swelling and pain more in the Left side, patient have been dealing with this since January and was referred by her cardiologist for PVR studies and ECHO but it was not done by the department at that time, her symptoms gotten worsen now and her L leg is remarkably bothersome, she denied recent trauma, SOB, CP or change in temp or loss in sensation. Will send the patient to have STAT duplex to r/o DVT and Duplex arterial studies.       Immunizations:  Immunization History   Administered Date(s)  Administered    Flu vaccine (IIV4), preservative free *Check age/dose* 10/03/2014, 12/11/2015, 09/16/2016    Flu vaccine, quadrivalent, high-dose, preservative free, age 65y+ (FLUZONE) 10/05/2022, 11/13/2023    Influenza, High Dose Seasonal, Preservative Free 11/02/2018, 11/08/2019, 09/14/2020, 09/07/2021    Influenza, Unspecified 01/24/2006, 01/10/2008, 09/21/2010, 11/16/2012    Influenza, seasonal, injectable 11/04/2011, 09/06/2013    Pfizer Gray Cap SARS-CoV-2 08/03/2022    Pfizer Purple Cap SARS-CoV-2 04/01/2021, 04/22/2021, 11/17/2021    Pneumococcal conjugate vaccine, 13-valent (PREVNAR 13) 11/30/2015, 12/11/2015    Pneumococcal polysaccharide vaccine, 23-valent, age 2 years and older (PNEUMOVAX 23) 01/24/2006, 08/14/2009    Td (adult), unspecified 01/24/2006    Zoster, live 12/11/2015     Please follow up in 6 weeks or sooner if needed

## 2024-06-21 ENCOUNTER — HOSPITAL ENCOUNTER (OUTPATIENT)
Dept: VASCULAR MEDICINE | Facility: HOSPITAL | Age: 79
Discharge: HOME | End: 2024-06-21
Payer: COMMERCIAL

## 2024-06-21 DIAGNOSIS — I73.9 PAD (PERIPHERAL ARTERY DISEASE) (CMS-HCC): ICD-10-CM

## 2024-06-21 DIAGNOSIS — I87.8 VENOUS STASIS SYNDROME: ICD-10-CM

## 2024-06-21 DIAGNOSIS — R60.0 LOCALIZED EDEMA: ICD-10-CM

## 2024-06-21 PROCEDURE — 93923 UPR/LXTR ART STDY 3+ LVLS: CPT | Performed by: INTERNAL MEDICINE

## 2024-06-21 PROCEDURE — 93923 UPR/LXTR ART STDY 3+ LVLS: CPT

## 2024-08-06 ENCOUNTER — LAB (OUTPATIENT)
Dept: LAB | Facility: LAB | Age: 79
End: 2024-08-06
Payer: COMMERCIAL

## 2024-08-06 ENCOUNTER — APPOINTMENT (OUTPATIENT)
Dept: PRIMARY CARE | Facility: CLINIC | Age: 79
End: 2024-08-06
Payer: COMMERCIAL

## 2024-08-06 VITALS — DIASTOLIC BLOOD PRESSURE: 70 MMHG | WEIGHT: 159 LBS | BODY MASS INDEX: 29.08 KG/M2 | SYSTOLIC BLOOD PRESSURE: 130 MMHG

## 2024-08-06 DIAGNOSIS — E55.9 VITAMIN D DEFICIENCY: ICD-10-CM

## 2024-08-06 DIAGNOSIS — E78.01 FAMILIAL HYPERCHOLESTEREMIA: ICD-10-CM

## 2024-08-06 DIAGNOSIS — I73.89 OTHER SPECIFIED PERIPHERAL VASCULAR DISEASES (CMS-HCC): ICD-10-CM

## 2024-08-06 DIAGNOSIS — I10 BENIGN ESSENTIAL HYPERTENSION: ICD-10-CM

## 2024-08-06 DIAGNOSIS — E11.622 TYPE 2 DIABETES MELLITUS WITH OTHER SKIN ULCER (CODE) (MULTI): ICD-10-CM

## 2024-08-06 DIAGNOSIS — I10 BENIGN ESSENTIAL HYPERTENSION: Primary | ICD-10-CM

## 2024-08-06 DIAGNOSIS — E11.9 TYPE 2 DIABETES MELLITUS WITHOUT COMPLICATION, WITHOUT LONG-TERM CURRENT USE OF INSULIN (MULTI): ICD-10-CM

## 2024-08-06 DIAGNOSIS — I13.0 HYPERTENSIVE HEART AND CHRONIC KIDNEY DISEASE WITH HEART FAILURE AND STAGE 1 THROUGH STAGE 4 CHRONIC KIDNEY DISEASE, OR UNSPECIFIED CHRONIC KIDNEY DISEASE (MULTI): ICD-10-CM

## 2024-08-06 DIAGNOSIS — N18.31 STAGE 3A CHRONIC KIDNEY DISEASE (MULTI): ICD-10-CM

## 2024-08-06 DIAGNOSIS — M79.89 LEFT LEG SWELLING: ICD-10-CM

## 2024-08-06 PROBLEM — J34.89 NASAL SORE: Status: RESOLVED | Noted: 2023-09-01 | Resolved: 2024-08-06

## 2024-08-06 PROBLEM — J31.0 CHRONIC RHINITIS: Status: RESOLVED | Noted: 2023-03-03 | Resolved: 2024-08-06

## 2024-08-06 PROBLEM — H61.23 EXCESSIVE CERUMEN IN BOTH EAR CANALS: Status: RESOLVED | Noted: 2023-09-01 | Resolved: 2024-08-06

## 2024-08-06 PROBLEM — R07.9 CHEST PAIN: Status: RESOLVED | Noted: 2023-03-03 | Resolved: 2024-08-06

## 2024-08-06 PROBLEM — K62.5 RECTAL BLEEDING: Status: RESOLVED | Noted: 2023-03-03 | Resolved: 2024-08-06

## 2024-08-06 PROBLEM — H93.8X9 CLOGGED EAR: Status: RESOLVED | Noted: 2023-09-01 | Resolved: 2024-08-06

## 2024-08-06 PROBLEM — E87.5 HYPERKALEMIA: Status: RESOLVED | Noted: 2023-03-03 | Resolved: 2024-08-06

## 2024-08-06 PROBLEM — E87.1 HYPONATREMIA: Status: RESOLVED | Noted: 2023-03-03 | Resolved: 2024-08-06

## 2024-08-06 PROBLEM — R09.82 PND (POST-NASAL DRIP): Status: RESOLVED | Noted: 2023-03-03 | Resolved: 2024-08-06

## 2024-08-06 LAB
25(OH)D3 SERPL-MCNC: 50 NG/ML (ref 30–100)
ALBUMIN SERPL BCP-MCNC: 4.1 G/DL (ref 3.4–5)
ALP SERPL-CCNC: 100 U/L (ref 33–136)
ALT SERPL W P-5'-P-CCNC: 15 U/L (ref 7–45)
ANION GAP SERPL CALC-SCNC: 14 MMOL/L (ref 10–20)
AST SERPL W P-5'-P-CCNC: 19 U/L (ref 9–39)
BILIRUB SERPL-MCNC: 0.3 MG/DL (ref 0–1.2)
BUN SERPL-MCNC: 34 MG/DL (ref 6–23)
CALCIUM SERPL-MCNC: 9.7 MG/DL (ref 8.6–10.6)
CHLORIDE SERPL-SCNC: 101 MMOL/L (ref 98–107)
CHOLEST SERPL-MCNC: 158 MG/DL (ref 0–199)
CHOLESTEROL/HDL RATIO: 2.3
CO2 SERPL-SCNC: 27 MMOL/L (ref 21–32)
CREAT SERPL-MCNC: 1.43 MG/DL (ref 0.5–1.05)
CREAT UR-MCNC: 33.8 MG/DL (ref 20–320)
EGFRCR SERPLBLD CKD-EPI 2021: 37 ML/MIN/1.73M*2
ERYTHROCYTE [DISTWIDTH] IN BLOOD BY AUTOMATED COUNT: 13.8 % (ref 11.5–14.5)
EST. AVERAGE GLUCOSE BLD GHB EST-MCNC: 111 MG/DL
GLUCOSE SERPL-MCNC: 92 MG/DL (ref 74–99)
HBA1C MFR BLD: 5.5 %
HCT VFR BLD AUTO: 35.1 % (ref 36–46)
HDLC SERPL-MCNC: 69 MG/DL
HGB BLD-MCNC: 10.9 G/DL (ref 12–16)
LDLC SERPL DIRECT ASSAY-MCNC: 68 MG/DL (ref 0–129)
MCH RBC QN AUTO: 29.7 PG (ref 26–34)
MCHC RBC AUTO-ENTMCNC: 31.1 G/DL (ref 32–36)
MCV RBC AUTO: 96 FL (ref 80–100)
MICROALBUMIN UR-MCNC: <7 MG/L
MICROALBUMIN/CREAT UR: NORMAL MG/G{CREAT}
NON-HDL CHOLESTEROL: 89 MG/DL (ref 0–149)
NRBC BLD-RTO: 0 /100 WBCS (ref 0–0)
PLATELET # BLD AUTO: 218 X10*3/UL (ref 150–450)
POTASSIUM SERPL-SCNC: 4.3 MMOL/L (ref 3.5–5.3)
PROT SERPL-MCNC: 7 G/DL (ref 6.4–8.2)
RBC # BLD AUTO: 3.67 X10*6/UL (ref 4–5.2)
RBC #/AREA URNS AUTO: NORMAL /HPF
SODIUM SERPL-SCNC: 138 MMOL/L (ref 136–145)
SQUAMOUS #/AREA URNS AUTO: NORMAL /HPF
TSH SERPL-ACNC: 2.04 MIU/L (ref 0.44–3.98)
WBC # BLD AUTO: 2.8 X10*3/UL (ref 4.4–11.3)
WBC #/AREA URNS AUTO: NORMAL /HPF

## 2024-08-06 PROCEDURE — 36415 COLL VENOUS BLD VENIPUNCTURE: CPT

## 2024-08-06 PROCEDURE — 82306 VITAMIN D 25 HYDROXY: CPT

## 2024-08-06 PROCEDURE — 99214 OFFICE O/P EST MOD 30 MIN: CPT | Performed by: INTERNAL MEDICINE

## 2024-08-06 PROCEDURE — 82570 ASSAY OF URINE CREATININE: CPT

## 2024-08-06 PROCEDURE — 84443 ASSAY THYROID STIM HORMONE: CPT

## 2024-08-06 PROCEDURE — 83718 ASSAY OF LIPOPROTEIN: CPT

## 2024-08-06 PROCEDURE — 85027 COMPLETE CBC AUTOMATED: CPT

## 2024-08-06 PROCEDURE — 81001 URINALYSIS AUTO W/SCOPE: CPT

## 2024-08-06 PROCEDURE — 3078F DIAST BP <80 MM HG: CPT | Performed by: INTERNAL MEDICINE

## 2024-08-06 PROCEDURE — G2211 COMPLEX E/M VISIT ADD ON: HCPCS | Performed by: INTERNAL MEDICINE

## 2024-08-06 PROCEDURE — 80053 COMPREHEN METABOLIC PANEL: CPT

## 2024-08-06 PROCEDURE — 3075F SYST BP GE 130 - 139MM HG: CPT | Performed by: INTERNAL MEDICINE

## 2024-08-06 PROCEDURE — 83721 ASSAY OF BLOOD LIPOPROTEIN: CPT

## 2024-08-06 PROCEDURE — 83036 HEMOGLOBIN GLYCOSYLATED A1C: CPT

## 2024-08-06 PROCEDURE — 82043 UR ALBUMIN QUANTITATIVE: CPT

## 2024-08-06 PROCEDURE — 82465 ASSAY BLD/SERUM CHOLESTEROL: CPT

## 2024-08-06 ASSESSMENT — ENCOUNTER SYMPTOMS
WHEEZING: 0
ABDOMINAL PAIN: 0
DYSURIA: 0
NUMBNESS: 1
CONFUSION: 0
FEVER: 0
NAUSEA: 0
DIZZINESS: 0
CHILLS: 0
EYE DISCHARGE: 0
SHORTNESS OF BREATH: 0
VOMITING: 0
FLANK PAIN: 0
MYALGIAS: 0
DIFFICULTY URINATING: 0
WOUND: 0
COUGH: 0
DIARRHEA: 0

## 2024-08-06 NOTE — PROGRESS NOTES
Subjective   Subjective:     History Of Present Illness:  Emma Oliva is a 79 y.o. female with a PMH of HTN, HLD, T2DM with neuropathy, MDD, CKD stage 3, vitamin D deficiency, vaginal prolapse, and constipation, who presents to [facility] clinic for follow up visit.  Today, she complains of worsening left leg leg swelling starting July.  She admits that her legs were of normal size in May.  Her swelling is associated with pain, numbness, and tingling sensations.      Past Medical History:  She has a past medical history of Body mass index (BMI) 27.0-27.9, adult (12/13/2019), Body mass index (BMI) 27.0-27.9, adult (08/07/2020), Body mass index (BMI) 29.0-29.9, adult (02/07/2020), Encounter for gynecological examination (general) (routine) without abnormal findings, Encounter for immunization (09/14/2020), Encounter for screening mammogram for malignant neoplasm of breast (09/05/2014), Encounter for screening mammogram for malignant neoplasm of breast (09/29/2015), Personal history of diseases of the blood and blood-forming organs and certain disorders involving the immune mechanism, Personal history of other diseases of the musculoskeletal system and connective tissue, Personal history of other diseases of the nervous system and sense organs, Personal history of other diseases of urinary system, Personal history of other medical treatment (02/19/2021), and Type 2 diabetes mellitus without complications (Multi) (10/05/2022).    Past Surgical History:  She has a past surgical history that includes Hysterectomy (10/23/2015); Bladder surgery (10/23/2015); Cataract extraction (10/23/2015); Other surgical history (10/23/2015); Breast surgery (10/23/2015); and Colon surgery (10/23/2015).     Social History:  She reports that she has quit smoking. Her smoking use included cigarettes. She has never used smokeless tobacco. She reports that she does not drink alcohol and does not use drugs.    Family History:  Family History    Problem Relation Name Age of Onset    Other (coma) Other      Heart attack Other      Kidney failure Other         Allergies:  Patient has no known allergies.    Home Medications:  (Not in a hospital admission)    Review Of Systems:  11-point ROS was performed and is negative except as noted below and in the HPI.     Review of Systems   Constitutional:  Negative for chills and fever.   Eyes:  Negative for discharge.   Respiratory:  Negative for cough, shortness of breath and wheezing.    Cardiovascular:  Positive for leg swelling. Negative for chest pain.   Gastrointestinal:  Negative for abdominal pain, diarrhea, nausea and vomiting.   Genitourinary:  Negative for difficulty urinating, dysuria and flank pain.   Musculoskeletal:  Negative for myalgias.   Skin:  Negative for wound.   Neurological:  Positive for numbness. Negative for dizziness.   Psychiatric/Behavioral:  Negative for confusion.         Objective   Objective:     /70   Wt 72.1 kg (159 lb)   BMI 29.08 kg/m²     Physical Exam  Constitutional:       General: She is not in acute distress.     Appearance: Normal appearance.   HENT:      Head: Normocephalic and atraumatic.      Mouth/Throat:      Mouth: Mucous membranes are moist.   Eyes:      Conjunctiva/sclera: Conjunctivae normal.      Pupils: Pupils are equal, round, and reactive to light.   Cardiovascular:      Rate and Rhythm: Normal rate and regular rhythm.      Heart sounds: Normal heart sounds.   Pulmonary:      Effort: No respiratory distress.      Breath sounds: Normal breath sounds. No wheezing or rhonchi.   Abdominal:      General: Bowel sounds are normal.      Palpations: Abdomen is soft.      Tenderness: There is no abdominal tenderness.   Musculoskeletal:         General: No swelling.      Cervical back: Neck supple.      Left lower leg: Edema present.   Skin:     General: Skin is warm and dry.   Neurological:      General: No focal deficit present.      Mental Status: She is  alert.          Assessment & Plan:     Assessment/Plan     Problem List Items Addressed This Visit       Benign essential hypertension - Primary    Relevant Orders    Comprehensive Metabolic Panel    CBC    CKD (chronic kidney disease)    Relevant Orders    Microscopic Only, Urine    Albumin-Creatinine Ratio, Urine Random    Diabetes mellitus (Multi)     - A1c repeat today  - not on any diabetes medications         Relevant Orders    Hemoglobin A1C    Familial hypercholesteremia    Relevant Orders    Cholesterol, LDL Direct    Lipid Panel Non-Fasting    TSH with reflex to Free T4 if abnormal    Other specified peripheral vascular diseases (CMS-[facility])     - vascular studies showed normal-patent vasculature         Vitamin D deficiency     - continue home Vitamin D supplement         Relevant Orders    Vitamin D 25-Hydroxy,Total (for eval of Vitamin D levels)     Other Visit Diagnoses       Left leg swelling        Relevant Orders    Referral to Vascular Medicine          #Health Maintenance:  - routine labs today  - compression stocks for swelling  - referral to vascular specialist    Revisit Topics: none    Dispo: Patient is scheduled to return to clinic in November.    Nadir Lewis, PGY-3  Internal Medicine     Disclaimer: Documentation completed with the information available at the time of input. The times in the chart may not be reflective of actual patient care times, interventions, or procedures. Documentation occurs after the physical care of the patient.

## 2024-08-07 NOTE — RESULT ENCOUNTER NOTE
Results reviewed.  There are some minor abnormalities, which are not concerning.  No changes in medications are needed at this time.  Please continue with current treatment.    Best,  Dr. Albright

## 2024-08-26 DIAGNOSIS — E78.01 FAMILIAL HYPERCHOLESTEREMIA: ICD-10-CM

## 2024-08-26 DIAGNOSIS — E55.9 VITAMIN D DEFICIENCY: ICD-10-CM

## 2024-08-26 RX ORDER — CALCITRIOL 0.25 UG/1
0.25 CAPSULE ORAL DAILY
Qty: 100 CAPSULE | Refills: 2 | Status: SHIPPED | OUTPATIENT
Start: 2024-08-26

## 2024-08-26 RX ORDER — ATORVASTATIN CALCIUM 40 MG/1
40 TABLET, FILM COATED ORAL DAILY
Qty: 100 TABLET | Refills: 2 | Status: SHIPPED | OUTPATIENT
Start: 2024-08-26

## 2024-10-04 DIAGNOSIS — T78.40XS ALLERGY, SEQUELA: ICD-10-CM

## 2024-10-05 RX ORDER — FLUTICASONE PROPIONATE 50 MCG
1 SPRAY, SUSPENSION (ML) NASAL 2 TIMES DAILY
Qty: 64 G | Refills: 1 | Status: SHIPPED | OUTPATIENT
Start: 2024-10-05

## 2024-10-08 ENCOUNTER — OFFICE VISIT (OUTPATIENT)
Dept: CARDIOLOGY | Facility: CLINIC | Age: 79
End: 2024-10-08
Payer: COMMERCIAL

## 2024-10-08 VITALS
OXYGEN SATURATION: 97 % | BODY MASS INDEX: 27.8 KG/M2 | SYSTOLIC BLOOD PRESSURE: 147 MMHG | WEIGHT: 152 LBS | HEART RATE: 91 BPM | DIASTOLIC BLOOD PRESSURE: 78 MMHG

## 2024-10-08 DIAGNOSIS — I73.9 PAD (PERIPHERAL ARTERY DISEASE) (CMS-HCC): ICD-10-CM

## 2024-10-08 DIAGNOSIS — M79.89 LEFT LEG SWELLING: ICD-10-CM

## 2024-10-08 DIAGNOSIS — I83.893 VARICOSE VEINS OF BOTH LEGS WITH EDEMA: Primary | ICD-10-CM

## 2024-10-08 DIAGNOSIS — I87.8 VENOUS STASIS SYNDROME: ICD-10-CM

## 2024-10-08 PROCEDURE — 1159F MED LIST DOCD IN RCRD: CPT | Performed by: INTERNAL MEDICINE

## 2024-10-08 PROCEDURE — 99204 OFFICE O/P NEW MOD 45 MIN: CPT | Performed by: INTERNAL MEDICINE

## 2024-10-08 PROCEDURE — 99214 OFFICE O/P EST MOD 30 MIN: CPT | Performed by: INTERNAL MEDICINE

## 2024-10-08 PROCEDURE — 1036F TOBACCO NON-USER: CPT | Performed by: INTERNAL MEDICINE

## 2024-10-08 PROCEDURE — 3077F SYST BP >= 140 MM HG: CPT | Performed by: INTERNAL MEDICINE

## 2024-10-08 PROCEDURE — 3078F DIAST BP <80 MM HG: CPT | Performed by: INTERNAL MEDICINE

## 2024-10-08 PROCEDURE — 1160F RVW MEDS BY RX/DR IN RCRD: CPT | Performed by: INTERNAL MEDICINE

## 2024-10-08 NOTE — PROGRESS NOTES
10/8/2024  12:11 PM    Referred by Dr. Eid for left leg edema.     History Of Present Illness:    Emma Oliva is a 79 y.o. female with PMHx HTN, HLD, T2DM, and CKD presenting with left left swelling. Swelling started in April, progressively worsened since. Initially evaluated by PCP. PCP c/f PAD vs DVT, lower extremity US and PVR ordered. Lower extremity US on 6/18/24 negative for DVT. Normal PVR. Since initial workup, patient notes worsening pain, hyperpigmentation, and swelling in LLE. Swelling improves with elevation, worsens at end of day and with walking. Has tried compression stockings in the past. Ordered some off of [employer], wore for 5 hours before noticing increased swelling in LLE. Endorses history of varicose veins. Underwent ablation in 2020 with Dr. Recinos, vascular surgery. Denies trauma to left lower extremity. Had R knee and R ankle surgery 2017.     Patient is a retired certified nurse assistant. Currently lives with daughter. Prior smoker, stopped smoking in 2005. Denies drug and EtOH use. Currently exercises 3x/week, strength training. Denies leg claudication. Leg pain and swelling has not decreased ability to walk and exercise.    REVIEW OF SYSTEMS:   No weight changes, fatigue, fevers, chills, night sweats   No itching, rashes, lesions  No headaches,  dizziness, vision changes, hearing changes, bleeding gums  No palpitations, chest pain, +leg swelling  No cough, SOB, wheezing  No abdominal pain, nausea, vomiting, appetite changes  No urinary urgency, dysuria, urinary frequency  No melena, hematuria  No bleeding, bruising          Past Medical History:  She has a past medical history of Body mass index (BMI) 27.0-27.9, adult (12/13/2019), Body mass index (BMI) 27.0-27.9, adult (08/07/2020), Body mass index (BMI) 29.0-29.9, adult (02/07/2020), Encounter for gynecological examination (general) (routine) without abnormal findings, Encounter for immunization (09/14/2020), Encounter for  screening mammogram for malignant neoplasm of breast (09/05/2014), Encounter for screening mammogram for malignant neoplasm of breast (09/29/2015), Personal history of diseases of the blood and blood-forming organs and certain disorders involving the immune mechanism, Personal history of other diseases of the musculoskeletal system and connective tissue, Personal history of other diseases of the nervous system and sense organs, Personal history of other diseases of urinary system, Personal history of other medical treatment (02/19/2021), and Type 2 diabetes mellitus without complications (Multi) (10/05/2022).    Past Surgical History:  She has a past surgical history that includes Hysterectomy (10/23/2015); Bladder surgery (10/23/2015); Cataract extraction (10/23/2015); Other surgical history (10/23/2015); Breast surgery (10/23/2015); and Colon surgery (10/23/2015).      Social History:  She reports that she has quit smoking. Her smoking use included cigarettes. She has never used smokeless tobacco. She reports that she does not drink alcohol and does not use drugs.    Family History:  Family History   Problem Relation Name Age of Onset    Other (coma) Other      Heart attack Other      Kidney failure Other          Allergies:  Patient has no known allergies.    Outpatient Medications:  Current Outpatient Medications   Medication Instructions    alpha lipoic acid 200 mg capsule 1 capsule, oral, Daily    aspirin 81 mg, oral, Daily    atorvastatin (LIPITOR) 40 mg, oral, Daily    blood sugar diagnostic (Accu-Chek Rylie Plus test strp) strip 1 strip, miscellaneous, Daily    calcitriol (ROCALTROL) 0.25 mcg, oral, Daily    calcium carbonate 1,500 mg, oral, Daily    cholecalciferol (VITAMIN D-3) 50 mcg, oral, Daily    citalopram (CELEXA) 10 mg, oral, Daily    cyanocobalamin (VITAMIN B-12) 1,000 mcg, oral, Daily    docusate sodium (COLACE) 100 mg, oral, Daily    fish oil concentrate (Omega-3) 120-180 mg capsule 1 capsule,  oral, Daily    fluticasone (Flonase) 50 mcg/actuation nasal spray 1 spray, Each Nostril, 2 times daily    magnesium 250 mg tablet 1 tablet, oral, Daily    multivitamin (MULTIPLE VITAMINS ORAL) 1 tablet, oral, Daily    Myrbetriq 25 mg, oral, Daily    polyethylene glycol (Glycolax, Miralax) 17 gram/dose powder oral, Daily RT    polyvinyl alcohol (Artificial Tears, polyvin alc,) 1.4 % ophthalmic solution No dose, route, or frequency recorded.        Last Recorded Vitals:  Vitals:    10/08/24 1059 10/08/24 1100   BP: 143/76 147/78   BP Location: Left arm Right arm   Pulse: 91    SpO2: 97%    Weight: 68.9 kg (152 lb)        Physical Exam:  General: Well developed. Alert. No acute distress.  Skin: Warm, dry. normal skin turgor. no rashes. no lesions. +1 LLE pitting edema. Hyperpigmentation of bilateral LE. +Varicose veins. No open wounds or ulcers on b/l LE.   HEENT: moist mucosa, no erythema.   Cardiovascular: Regular rate and rhythm, normal S1 and S2, no gallops, no murmurs and no pericardial rub.  Pulmonary: CTAB. no crackles, rales, rhonchi.  Abdomen: Soft. non-tender. non-distended. no abdominal masses. no guarding or rigidity.  Neurologic: CN 2-12 grossly intact. Sensation equal b/l and grossly intact.  Musculoskeletal: Normal gait.   Psychiatric: Good judgment. Good insight. Alert and oriented x3 (place, person, time). Normal mood. Normal affect.     Last Labs:  CBC -  Lab Results   Component Value Date    WBC 2.8 (L) 08/06/2024    HGB 10.9 (L) 08/06/2024    HCT 35.1 (L) 08/06/2024    MCV 96 08/06/2024     08/06/2024       CMP -  Lab Results   Component Value Date    CALCIUM 9.7 08/06/2024    PHOS 3.1 03/08/2022    PROT 7.0 08/06/2024    ALBUMIN 4.1 08/06/2024    AST 19 08/06/2024    ALT 15 08/06/2024    ALKPHOS 100 08/06/2024    BILITOT 0.3 08/06/2024       LIPID PANEL -   Lab Results   Component Value Date    CHOL 158 08/06/2024    TRIG 52 01/30/2024    HDL 69.0 08/06/2024    CHHDL 2.3 08/06/2024    LDLF  95 10/05/2022    VLDL 10 01/30/2024    NHDL 70 01/30/2024       RENAL FUNCTION PANEL -   Lab Results   Component Value Date    GLUCOSE 92 08/06/2024     08/06/2024    K 4.3 08/06/2024     08/06/2024    CO2 27 08/06/2024    ANIONGAP 14 08/06/2024    BUN 34 (H) 08/06/2024    CREATININE 1.43 (H) 08/06/2024    CALCIUM 9.7 08/06/2024    PHOS 3.1 03/08/2022    ALBUMIN 4.1 08/06/2024        Lab Results   Component Value Date    HGBA1C 5.5 08/06/2024       Imaging:    PVR without Exercise 6/18/2024:  CONCLUSIONS:  Right Lower PVR: No evidence of arterial occlusive disease in the right lower extremity at rest. Normal digital perfusion noted. Multiphasic flow is noted in the right common femoral artery, right posterior tibial artery and right dorsalis pedis artery.  Left Lower PVR: No evidence of arterial occlusive disease in the left lower extremity at rest. Normal digital perfusion noted. Multiphasic flow is noted in the left common femoral artery, left posterior tibial artery and left dorsalis pedis artery. Due to severe swelling/edema, waveforms may appear dampened.     Imaging & Doppler Findings:     RIGHT Lower PVR                Pressures Ratios  Right Posterior Tibial (Ankle) 133 mmHg  1.06  Right Dorsalis Pedis (Ankle)   128 mmHg  1.02  Right Digit (Great Toe)        101 mmHg  0.81           LEFT Lower PVR                Pressures Ratios  Left Posterior Tibial (Ankle) 138 mmHg  1.10  Left Dorsalis Pedis (Ankle)   133 mmHg  1.06  Left Digit (Great Toe)        90 mmHg   0.72                           Right     Left  Brachial Pressure 122 mmHg 125 mmHg     Lower Extremity US 6/18/2024:  CONCLUSIONS:  Right Lower Venous: No evidence of acute deep vein thrombus visualized in the right lower extremity. Right calf veins visualized in segments due to swelling/edema.  Left Lower Venous: No evidence of acute deep vein thrombus visualized in the left lower extremity. Left calf veins visualized in segments due to  swelling/edema.     Imaging & Doppler Findings:     Right                 Compressible Thrombus        Flow  Distal External Iliac     Yes        None  CFV                       Yes        None   Spontaneous/Phasic  PFV                       Yes        None  FV Proximal               Yes        None   Spontaneous/Phasic  FV Mid                    Yes        None  FV Distal                 Yes        None  Popliteal                 Yes        None   Spontaneous/Phasic  Peroneal                  Yes        None  PTV                       Yes        None        Left                  Compress Thrombus        Flow  Distal External Iliac   Yes      None  CFV                     Yes      None   Spontaneous/Phasic  PFV                     Yes      None  FV Proximal             Yes      None   Spontaneous/Phasic  FV Mid                  Yes      None  FV Distal               Yes      None  Popliteal               Yes      None   Spontaneous/Phasic  Peroneal                Yes      None  PTV                     Yes      None        [id number] Faith Marley MD, RPVI  Electronically signed by [id number] Faith Marley MD, RJ on 6/20/2024 at 10:10:34 AM      Assessment/Plan   Diagnoses and all orders for this visit:  Varicose veins of both legs with edema  Venous stasis syndrome  Left leg swelling  Patient with history of varicose veins, underwent prior R venous ablation in 2020, now presenting with worsening leg edema, pain, and hyperpigmentation. Recommend further evaluation to assess severity of venous insufficiency, ordered US Lower Extremity Venous Insufficiency. Counseled patient on elevating legs when sitting during the day and sleeping at night in addition to use of Compression Stockings 15-20 mmHg. Instructed to wear compression stockings throughout the day, only to remove at night. Patient to return to clinic following LE US to discuss results.     Mima Cuevas, DO  Internal Medicine, PGY-2

## 2024-10-08 NOTE — PATIENT INSTRUCTIONS
I want you to elevate your legs as much as you can when you are sitting.     I would really like you to get back into some kind of compression sock. I would recommend a sock without a zipper. I have given you a prescription for socks.    You can get compression socks at [employer] or any other store that sells durable medical goods.     Once you have decided where you are going to go for socks, call in advance and find out when the person who measures you for socks will be there. Plan to go fairly early in the day. Many people have swelling that is worse toward the end of the day. If you are measured at the end of the day, you may not get a good fit.    You should put on your compression socks first thing in the morning. Take them off before bed.    If you use lotion or moisturizer on your legs, do NOT put lotion or moisturizer on immediately before you put on your socks, as it will make them difficult to pull up. Use your lotion or moisturizer at night.    Compression socks can be hand-washed or washed in a mesh bag in the washing machine. Air dry them. Do NOT put them in the dryer.    I want you to get an ultrasound of your varicose veins. You can do that here at [facility].    Please get on my schedule for next available after you ultrasound.    Should you have questions, please do not hesitate to call my office at [phone], or you can reach me on [employer] if you have signed up for it. If you have urgent concerns, call the office, do not use [employer].

## 2024-10-22 ENCOUNTER — ANCILLARY PROCEDURE (OUTPATIENT)
Dept: VASCULAR MEDICINE | Facility: CLINIC | Age: 79
End: 2024-10-22
Payer: COMMERCIAL

## 2024-10-22 DIAGNOSIS — R60.0 LOCALIZED EDEMA: ICD-10-CM

## 2024-10-22 DIAGNOSIS — I83.893 VARICOSE VEINS OF BOTH LEGS WITH EDEMA: ICD-10-CM

## 2024-10-22 PROCEDURE — 93970 EXTREMITY STUDY: CPT

## 2024-10-22 PROCEDURE — 93970 EXTREMITY STUDY: CPT | Performed by: INTERNAL MEDICINE

## 2024-10-29 ENCOUNTER — TELEPHONE (OUTPATIENT)
Dept: CARDIOLOGY | Facility: CLINIC | Age: 79
End: 2024-10-29
Payer: COMMERCIAL

## 2024-10-29 DIAGNOSIS — I87.8 VENOUS STASIS SYNDROME: Primary | ICD-10-CM

## 2024-10-29 NOTE — TELEPHONE ENCOUNTER
Notified patient of venous insufficiency results. I'm not sure if she's a candidate for a procedure, but I think it would be reasonable for her to discuss with vein intervention team.     She has not yet been to [employer] to purchase her socks. I encouraged her to do that right away.    Will ask staff to schedule with [facility] team.

## 2024-11-19 ENCOUNTER — APPOINTMENT (OUTPATIENT)
Dept: VASCULAR SURGERY | Facility: HOSPITAL | Age: 79
End: 2024-11-19
Payer: COMMERCIAL

## 2024-11-26 ENCOUNTER — APPOINTMENT (OUTPATIENT)
Dept: PRIMARY CARE | Facility: CLINIC | Age: 79
End: 2024-11-26
Payer: COMMERCIAL

## 2024-11-26 VITALS
DIASTOLIC BLOOD PRESSURE: 64 MMHG | WEIGHT: 155 LBS | SYSTOLIC BLOOD PRESSURE: 135 MMHG | BODY MASS INDEX: 28.52 KG/M2 | HEIGHT: 62 IN | HEART RATE: 60 BPM

## 2024-11-26 DIAGNOSIS — Z23 FLU VACCINE NEED: ICD-10-CM

## 2024-11-26 DIAGNOSIS — I10 BENIGN ESSENTIAL HYPERTENSION: Primary | ICD-10-CM

## 2024-11-26 DIAGNOSIS — R73.9 HYPERGLYCEMIA: ICD-10-CM

## 2024-11-26 DIAGNOSIS — N18.32 STAGE 3B CHRONIC KIDNEY DISEASE (MULTI): ICD-10-CM

## 2024-11-26 DIAGNOSIS — F32.5 MAJOR DEPRESSIVE DISORDER IN REMISSION, UNSPECIFIED WHETHER RECURRENT (CMS-HCC): ICD-10-CM

## 2024-11-26 DIAGNOSIS — E55.9 VITAMIN D DEFICIENCY: ICD-10-CM

## 2024-11-26 DIAGNOSIS — E78.01 FAMILIAL HYPERCHOLESTEREMIA: ICD-10-CM

## 2024-11-26 DIAGNOSIS — E11.9 TYPE 2 DIABETES MELLITUS WITHOUT COMPLICATION, WITHOUT LONG-TERM CURRENT USE OF INSULIN (MULTI): ICD-10-CM

## 2024-11-26 DIAGNOSIS — N18.31 STAGE 3A CHRONIC KIDNEY DISEASE (MULTI): ICD-10-CM

## 2024-11-26 DIAGNOSIS — I73.89 OTHER SPECIFIED PERIPHERAL VASCULAR DISEASES: ICD-10-CM

## 2024-11-26 PROBLEM — R41.82 ALTERED MENTAL STATUS: Status: RESOLVED | Noted: 2023-03-03 | Resolved: 2024-11-26

## 2024-11-26 PROBLEM — R25.2 LEG CRAMPS: Status: RESOLVED | Noted: 2023-03-03 | Resolved: 2024-11-26

## 2024-11-26 PROBLEM — R60.9 EDEMA: Status: RESOLVED | Noted: 2023-03-03 | Resolved: 2024-11-26

## 2024-11-26 PROBLEM — R68.89 SENSATION OF FEELING COLD: Status: RESOLVED | Noted: 2023-03-03 | Resolved: 2024-11-26

## 2024-11-26 PROBLEM — R53.83 FATIGUE: Status: RESOLVED | Noted: 2023-03-03 | Resolved: 2024-11-26

## 2024-11-26 PROBLEM — R21 SKIN RASH: Status: RESOLVED | Noted: 2023-03-03 | Resolved: 2024-11-26

## 2024-11-26 LAB — POC HEMOGLOBIN A1C: 5.9 % (ref 4.2–6.5)

## 2024-11-26 PROCEDURE — G0008 ADMIN INFLUENZA VIRUS VAC: HCPCS | Performed by: INTERNAL MEDICINE

## 2024-11-26 PROCEDURE — 1036F TOBACCO NON-USER: CPT | Performed by: INTERNAL MEDICINE

## 2024-11-26 PROCEDURE — 83036 HEMOGLOBIN GLYCOSYLATED A1C: CPT | Performed by: INTERNAL MEDICINE

## 2024-11-26 PROCEDURE — 90662 IIV NO PRSV INCREASED AG IM: CPT | Performed by: INTERNAL MEDICINE

## 2024-11-26 PROCEDURE — 3075F SYST BP GE 130 - 139MM HG: CPT | Performed by: INTERNAL MEDICINE

## 2024-11-26 PROCEDURE — 99214 OFFICE O/P EST MOD 30 MIN: CPT | Performed by: INTERNAL MEDICINE

## 2024-11-26 PROCEDURE — 3078F DIAST BP <80 MM HG: CPT | Performed by: INTERNAL MEDICINE

## 2024-11-26 ASSESSMENT — ENCOUNTER SYMPTOMS
DIARRHEA: 0
MYALGIAS: 0
FLANK PAIN: 0
CHILLS: 0
DYSURIA: 0
DIFFICULTY URINATING: 0
VOMITING: 0
FEVER: 0
WOUND: 0
DIZZINESS: 0
COUGH: 0
WHEEZING: 0
EYE DISCHARGE: 0
SHORTNESS OF BREATH: 0
CONFUSION: 0
NUMBNESS: 0
NAUSEA: 0
ABDOMINAL PAIN: 0

## 2024-11-26 ASSESSMENT — LIFESTYLE VARIABLES
AUDIT-C TOTAL SCORE: 0
HOW MANY STANDARD DRINKS CONTAINING ALCOHOL DO YOU HAVE ON A TYPICAL DAY: PATIENT DOES NOT DRINK
SKIP TO QUESTIONS 9-10: 1
HOW OFTEN DO YOU HAVE SIX OR MORE DRINKS ON ONE OCCASION: NEVER
HOW OFTEN DO YOU HAVE A DRINK CONTAINING ALCOHOL: NEVER

## 2024-11-26 ASSESSMENT — PATIENT HEALTH QUESTIONNAIRE - PHQ9
2. FEELING DOWN, DEPRESSED OR HOPELESS: NOT AT ALL
1. LITTLE INTEREST OR PLEASURE IN DOING THINGS: NOT AT ALL
SUM OF ALL RESPONSES TO PHQ9 QUESTIONS 1 AND 2: 0

## 2024-11-26 NOTE — ASSESSMENT & PLAN NOTE
-Ultrasound of bilateral lower extremities for venous insufficiency showed bilateral reflux in multiple veins  -Follows up with vascular surgery and scheduled to see them again in December  -Continue wearing compression stockings, tolerates well

## 2024-11-26 NOTE — ASSESSMENT & PLAN NOTE
-Patient is not taking any medications  -A1c 5.9% today, worsened from 5.6% 3 months ago  -Recommended to continue exercising and decrease the consumption of processed meats, fried foods, white breads, and sweet drinks

## 2024-11-26 NOTE — PROGRESS NOTES
Subjective   Subjective:     History Of Present Illness:  Emma Oliva is a 79 y.o. female with a PMH of HTN, HLD, T2DM with neuropathy, MDD, CKD stage 3, vitamin D deficiency, vaginal prolapse, and constipation, who presents to [facility] clinic for follow up visit.  Patient states that her lower extremity swelling has significantly improved and nearly gone after consistently wearing compression stockings ([employer] brand 15-20 mmHg) and elevating her feet at night.  Additionally she followed up with vascular surgery who ordered ultrasound of the lower extremities to rule out venous insufficiency which showed bilateral lower extremity reflux in multiple veins and continue wearing compression stockings.  She is currently scheduled to follow-up with vascular surgery in December.  Otherwise denies other associated symptoms.    Past Medical History:  She has a past medical history of Body mass index (BMI) 27.0-27.9, adult (12/13/2019), Body mass index (BMI) 27.0-27.9, adult (08/07/2020), Body mass index (BMI) 29.0-29.9, adult (02/07/2020), Encounter for gynecological examination (general) (routine) without abnormal findings, Encounter for immunization (09/14/2020), Encounter for screening mammogram for malignant neoplasm of breast (09/05/2014), Encounter for screening mammogram for malignant neoplasm of breast (09/29/2015), Personal history of diseases of the blood and blood-forming organs and certain disorders involving the immune mechanism, Personal history of other diseases of the musculoskeletal system and connective tissue, Personal history of other diseases of the nervous system and sense organs, Personal history of other diseases of urinary system, Personal history of other medical treatment (02/19/2021), and Type 2 diabetes mellitus without complications (Multi) (10/05/2022).    Past Surgical History:  She has a past surgical history that includes Hysterectomy (10/23/2015); Bladder surgery (10/23/2015); Cataract  "extraction (10/23/2015); Other surgical history (10/23/2015); Breast surgery (10/23/2015); and Colon surgery (10/23/2015).     Social History:  She reports that she has quit smoking. Her smoking use included cigarettes. She has been exposed to tobacco smoke. She has never used smokeless tobacco. She reports that she does not drink alcohol and does not use drugs.    Family History:  Family History   Problem Relation Name Age of Onset    Other (coma) Other      Heart attack Other      Kidney failure Other       Allergies:  Patient has no known allergies.    Home Medications:  (Not in a hospital admission)    Review Of Systems:  11-point ROS was performed and is negative except as noted below and in the HPI.     Review of Systems   Constitutional:  Negative for chills and fever.   Eyes:  Negative for discharge.   Respiratory:  Negative for cough, shortness of breath and wheezing.    Cardiovascular:  Negative for chest pain and leg swelling.   Gastrointestinal:  Negative for abdominal pain, diarrhea, nausea and vomiting.   Genitourinary:  Negative for difficulty urinating, dysuria and flank pain.   Musculoskeletal:  Negative for myalgias.   Skin:  Negative for wound.   Neurological:  Negative for dizziness and numbness.   Psychiatric/Behavioral:  Negative for confusion.         Objective   Objective:     /64 (BP Location: Right arm, Patient Position: Sitting, BP Cuff Size: Adult)   Pulse 60   Ht 1.575 m (5' 2\")   Wt 70.3 kg (155 lb)   BMI 28.35 kg/m²     Physical Exam  Constitutional:       General: She is not in acute distress.     Appearance: Normal appearance.   HENT:      Head: Normocephalic and atraumatic.      Mouth/Throat:      Mouth: Mucous membranes are moist.   Eyes:      Conjunctiva/sclera: Conjunctivae normal.      Pupils: Pupils are equal, round, and reactive to light.   Cardiovascular:      Rate and Rhythm: Normal rate and regular rhythm.      Heart sounds: Normal heart sounds.   Pulmonary:      " Effort: No respiratory distress.      Breath sounds: Normal breath sounds. No wheezing or rhonchi.   Abdominal:      General: Bowel sounds are normal.      Palpations: Abdomen is soft.      Tenderness: There is no abdominal tenderness.   Musculoskeletal:         General: No swelling.      Cervical back: Neck supple.      Right lower leg: No edema.      Left lower leg: No edema.   Skin:     General: Skin is warm and dry.   Neurological:      General: No focal deficit present.      Mental Status: She is alert.          Assessment & Plan:     Assessment/Plan     Problem List Items Addressed This Visit       Benign essential hypertension - Primary     -Well-controlled with lifestyle, diet, exercise         CKD (chronic kidney disease)     -Stable disease state  -Not on any nephrotoxic medications         Depression, major     -Patient is doing well  -Stable disease state  -Continue taking citalopram 10 mg         Diabetes mellitus (Multi)     -Patient is not taking any medications  -A1c 5.9% today, worsened from 5.6% 3 months ago  -Recommended to continue exercising and decrease the consumption of processed meats, fried foods, white breads, and sweet drinks         Familial hypercholesteremia     -Continue atorvastatin 40 mg  -Lipid panel will be ordered in March         Other specified peripheral vascular diseases     -Ultrasound of bilateral lower extremities for venous insufficiency showed bilateral reflux in multiple veins  -Follows up with vascular surgery and scheduled to see them again in December  -Continue wearing compression stockings, tolerates well         Vitamin D deficiency     -Continue taking vitamin D 2000 units daily  -Repeat vitamin D levels in March          Other Visit Diagnoses       Flu vaccine need        Relevant Orders    Flu vaccine, trivalent, preservative free, HIGH-DOSE, age 65y+ (Fluzone) (Completed)    Hemoglobin A1c    POCT glycosylated hemoglobin (Hb A1C) manually resulted     Hyperglycemia        Relevant Orders    Hemoglobin A1c    POCT glycosylated hemoglobin (Hb A1C) manually resulted          #Health Maintenance:  - Routine labs in March     Revisit Topics: left lower extremity swelling    Dispo: Patient is scheduled to return to clinic in March.    Nadir Lewis, PGY-3  Internal Medicine     Disclaimer: Documentation completed with the information available at the time of input. The times in the chart may not be reflective of actual patient care times, interventions, or procedures. Documentation occurs after the physical care of the patient.

## 2024-12-04 ENCOUNTER — APPOINTMENT (OUTPATIENT)
Dept: VASCULAR SURGERY | Facility: CLINIC | Age: 79
End: 2024-12-04
Payer: COMMERCIAL

## 2024-12-05 DIAGNOSIS — F32.9 MAJOR DEPRESSIVE DISORDER, REMISSION STATUS UNSPECIFIED, UNSPECIFIED WHETHER RECURRENT: ICD-10-CM

## 2024-12-05 RX ORDER — CITALOPRAM 10 MG/1
10 TABLET ORAL DAILY
Qty: 90 TABLET | Refills: 3 | Status: SHIPPED | OUTPATIENT
Start: 2024-12-05

## 2024-12-12 ENCOUNTER — OFFICE VISIT (OUTPATIENT)
Facility: CLINIC | Age: 79
End: 2024-12-12
Payer: COMMERCIAL

## 2024-12-12 VITALS
HEIGHT: 62 IN | HEART RATE: 70 BPM | DIASTOLIC BLOOD PRESSURE: 60 MMHG | WEIGHT: 151 LBS | BODY MASS INDEX: 27.79 KG/M2 | SYSTOLIC BLOOD PRESSURE: 106 MMHG

## 2024-12-12 DIAGNOSIS — I87.8 VENOUS STASIS SYNDROME: ICD-10-CM

## 2024-12-12 PROCEDURE — 1159F MED LIST DOCD IN RCRD: CPT | Performed by: STUDENT IN AN ORGANIZED HEALTH CARE EDUCATION/TRAINING PROGRAM

## 2024-12-12 PROCEDURE — 1126F AMNT PAIN NOTED NONE PRSNT: CPT | Performed by: STUDENT IN AN ORGANIZED HEALTH CARE EDUCATION/TRAINING PROGRAM

## 2024-12-12 PROCEDURE — 99214 OFFICE O/P EST MOD 30 MIN: CPT | Performed by: STUDENT IN AN ORGANIZED HEALTH CARE EDUCATION/TRAINING PROGRAM

## 2024-12-12 PROCEDURE — 99204 OFFICE O/P NEW MOD 45 MIN: CPT | Performed by: STUDENT IN AN ORGANIZED HEALTH CARE EDUCATION/TRAINING PROGRAM

## 2024-12-12 PROCEDURE — 3078F DIAST BP <80 MM HG: CPT | Performed by: STUDENT IN AN ORGANIZED HEALTH CARE EDUCATION/TRAINING PROGRAM

## 2024-12-12 PROCEDURE — 3074F SYST BP LT 130 MM HG: CPT | Performed by: STUDENT IN AN ORGANIZED HEALTH CARE EDUCATION/TRAINING PROGRAM

## 2024-12-12 ASSESSMENT — ENCOUNTER SYMPTOMS
LOSS OF SENSATION IN FEET: 1
OCCASIONAL FEELINGS OF UNSTEADINESS: 0
DEPRESSION: 0

## 2024-12-12 ASSESSMENT — COLUMBIA-SUICIDE SEVERITY RATING SCALE - C-SSRS
1. IN THE PAST MONTH, HAVE YOU WISHED YOU WERE DEAD OR WISHED YOU COULD GO TO SLEEP AND NOT WAKE UP?: NO
2. HAVE YOU ACTUALLY HAD ANY THOUGHTS OF KILLING YOURSELF?: NO
6. HAVE YOU EVER DONE ANYTHING, STARTED TO DO ANYTHING, OR PREPARED TO DO ANYTHING TO END YOUR LIFE?: NO

## 2024-12-12 ASSESSMENT — PAIN SCALES - GENERAL: PAINLEVEL_OUTOF10: 0-NO PAIN

## 2024-12-12 NOTE — PROGRESS NOTES
"+ HEAVINESS  No venous wounds  No venous claudication  No wounds  Neuropathy   No VALENZUELA, no heart failure    No fam hx blood clots    PMH: CKD  Surgeries:       Primary Care Physician: Natalee Eid MD  Referring [place]: [place]  [id number]  [facility] [place] [address]  Date of Visit: 12/12/2024  1:30 PM EST  Location of visit: [facility] [place] [address]     Chief Complaint:   No chief complaint on file.       HPI / Summary:   Emma Oliva is a 79 y.o. female presents for evaluation of bilateral venous stasis skin changes.  Pt endorses heaviness but no venous wounds or venous claudication. She also endorses BLE neuropathy in both feet. Denies VALENZUELA. No h/o heart failure. She does have CKD.     She does not have significant edema but notes that her skin is \"tight\"  near her left ankle. Also notes dryness of lower leg skin.  She does have significant skin darkening from mid-calf down. She has worn compression in the past but not consistently. She did start wearing them about a month ago.     She previously had R GSV vein stripping by Dr. Recinos in 2020.    PMH: HTN, HLD, Dm, CKD  PSH: myomectomy, R breast lumpectomy, R GSV stripping, R knee surgery, R ankle surgery   Soc:  former smoker, independent  Fam: no known fam hx of blood clots or clotting disorders    Past Vascular Testing:   VI (10/2024): R SFJ reflux, L proximal calf reflux, CFV reflux  Venous BLE (6/2024): no DVT BL  RHONA/PVR (6/2024): R 1.02, L 1.06    Allergies:   Patient has no known allergies.    Outpatient Medications:  Current Outpatient Medications   Medication Sig Dispense Refill    alpha lipoic acid 200 mg capsule Take 1 capsule (200 mg) by mouth once daily.      aspirin 81 mg EC tablet Take 1 tablet (81 mg) by mouth once daily. 90 tablet 0    atorvastatin (Lipitor) 40 mg tablet TAKE 1 TABLET BY MOUTH ONCE  DAILY 100 tablet 2    blood sugar diagnostic (Accu-Chek Rylie Plus test strp) strip 1 strip once " "daily. 300 strip 2    calcitriol (Rocaltrol) 0.25 mcg capsule TAKE 1 CAPSULE BY MOUTH ONCE  DAILY 100 capsule 2    calcium carbonate 600 mg calcium (1,500 mg) tablet Take 1 tablet (1,500 mg) by mouth once daily. 90 tablet 0    cholecalciferol (Vitamin D-3) 50 mcg (2,000 unit) capsule Take 1 capsule (50 mcg) by mouth early in the morning.. 90 capsule 0    citalopram (CeleXA) 10 mg tablet TAKE 1 TABLET BY MOUTH ONCE  DAILY 90 tablet 3    cyanocobalamin (Vitamin B-12) 1,000 mcg tablet Take 1 tablet (1,000 mcg) by mouth once daily. 90 tablet 0    docusate sodium (Colace) 100 mg capsule Take 1 capsule (100 mg) by mouth once daily. 60 capsule 0    fish oil concentrate (Omega-3) 120-180 mg capsule Take 1 capsule (1 g) by mouth once daily.      fluticasone (Flonase) 50 mcg/actuation nasal spray USE 1 SPRAY IN BOTH NOSTRILS  TWICE DAILY 64 g 1    magnesium 250 mg tablet Take 1 tablet (250 mg) by mouth once daily.      multivitamin (MULTIPLE VITAMINS ORAL) Take 1 tablet by mouth once daily.      Myrbetriq 25 mg tablet extended release 24 hr 24 hr tablet TAKE 1 TABLET BY MOUTH ONCE  DAILY 90 tablet 3    polyethylene glycol (Glycolax, Miralax) 17 gram/dose powder Take by mouth once daily.       No current facility-administered medications for this visit.     Review of Systems:  Review of Systems     Physical Exam:  Blood pressure 106/60, pulse 70, height 1.575 m (5' 2\"), weight 68.5 kg (151 lb).  Wt Readings from Last 1 Encounters:   12/12/24 68.5 kg (151 lb)     Physical Exam  Neuro: alert and oriented x3  Resp: comfortable on room air  CV: well perfused  Abd: soft ntnd  Pulse exam: both feet wwp, palp pedal pulses  +hyperpigmentation from mid-calf down  L calf skin is thin with +LDS--no previous wounds in this area  Dry, flaky skin from mid-calf down bilaterally    Last Labs:  CMP:    Chemistry    Lab Results   Component Value Date/Time     08/06/2024 1216    K 4.3 08/06/2024 1216     08/06/2024 1216    CO2 27 " 08/06/2024 1216    BUN 34 (H) 08/06/2024 1216    CREATININE 1.43 (H) 08/06/2024 1216    Lab Results   Component Value Date/Time    CALCIUM 9.7 08/06/2024 1216    ALKPHOS 100 08/06/2024 1216    AST 19 08/06/2024 1216    ALT 15 08/06/2024 1216    BILITOT 0.3 08/06/2024 1216          CBC:  Lab Results   Component Value Date    WBC 2.8 (L) 08/06/2024    HGB 10.9 (L) 08/06/2024    HCT 35.1 (L) 08/06/2024    MCV 96 08/06/2024     08/06/2024     HEME/ENDO:  Recent Labs     11/26/24  1354 08/06/24  1216   TSH  --  2.04   HGBA1C 5.9 5.5      CARDIAC: No data recorded  Lab Results   Component Value Date    LDLCALC 60 01/30/2024       Notable Studies:         Assessment/Plan   79F with PMH HTN, HLD, T2DM, and CKD presenting with left> right leg chronic venous stasis skin changes.     #chronic venous stasis  - recommend continued compression, she has worn them for one month  - she does have evidence of reflux in her L calf GSV, could consider venaseal if pt has no improvement with conservative management  - will have pt follow up with Janeen Maria NP after 2 additional months compression to see if she has symptomatic improvement and determine if venous procedure is needed  - addressed importance of skin care especially moisturizer to both legs daily  - strict return precautions given regarding wound formation, pt knows to call if she develops a wound    All questions/concerns addressed    Orders:  Orders Placed This Encounter   Procedures    Referral to Vascular Surgery              ____________________________________________________________  Kaitlyn M Dunphy, MD  [facility]  [facility]

## 2024-12-12 NOTE — PATIENT INSTRUCTIONS
- potential candidate for venous ablation, will refer to Janeen Maria NP for the procedure  - continue compression stockings, need to wear at least 3 months  - daily moisturizer to legs and feet  - walk daily  - elevate legs frequently  - call clinic at [phone] with questions or if you develop and open wound/sore on your leg

## 2025-01-02 ENCOUNTER — OFFICE VISIT (OUTPATIENT)
Dept: CARDIOLOGY | Facility: CLINIC | Age: 80
End: 2025-01-02
Payer: COMMERCIAL

## 2025-01-02 VITALS
WEIGHT: 150 LBS | BODY MASS INDEX: 27.44 KG/M2 | SYSTOLIC BLOOD PRESSURE: 126 MMHG | HEART RATE: 79 BPM | DIASTOLIC BLOOD PRESSURE: 67 MMHG | OXYGEN SATURATION: 98 %

## 2025-01-02 DIAGNOSIS — I87.8 VENOUS STASIS SYNDROME: Primary | ICD-10-CM

## 2025-01-02 PROCEDURE — 1160F RVW MEDS BY RX/DR IN RCRD: CPT | Performed by: INTERNAL MEDICINE

## 2025-01-02 PROCEDURE — 3074F SYST BP LT 130 MM HG: CPT | Performed by: INTERNAL MEDICINE

## 2025-01-02 PROCEDURE — 1036F TOBACCO NON-USER: CPT | Performed by: INTERNAL MEDICINE

## 2025-01-02 PROCEDURE — 1159F MED LIST DOCD IN RCRD: CPT | Performed by: INTERNAL MEDICINE

## 2025-01-02 PROCEDURE — 99213 OFFICE O/P EST LOW 20 MIN: CPT | Performed by: INTERNAL MEDICINE

## 2025-01-02 PROCEDURE — 3078F DIAST BP <80 MM HG: CPT | Performed by: INTERNAL MEDICINE

## 2025-01-02 NOTE — PROGRESS NOTES
History of Present Illness:  Emma Oliva is a/an 79 y.o. woman who follows up for left leg swelling in the setting of chronic venous disease. She has a history of venous ablation in the past. She was last seen on 10/8/2024, at which time I recommended resuming compression socks. We got a VI study as well, which showed multiple varicose veins, ablated GSV on the right, and reflux isolated to the calf on the left.    Subsequently I referred [facility] to the [facility] for an opinion on more advanced treatment. She was NOT scheduled there but was scheduled with Dr. Dunphy, who recommended she see Janeen Maria in the [facility].    Working at gym 3 days a week; walks on the treadmill    Past Medical History:   Diagnosis Date    Body mass index (BMI) 27.0-27.9, adult 12/13/2019    BMI 27.0-27.9,adult    Body mass index (BMI) 27.0-27.9, adult 08/07/2020    Body mass index (BMI) of 27.0 to 27.9 in adult    Body mass index (BMI) 29.0-29.9, adult 02/07/2020    BMI 29.0-29.9,adult    Encounter for gynecological examination (general) (routine) without abnormal findings     Encounter for routine pelvic examination    Encounter for immunization 09/14/2020    Need for prophylactic vaccination and inoculation against influenza    Encounter for screening mammogram for malignant neoplasm of breast 09/05/2014    Visit for screening mammogram    Encounter for screening mammogram for malignant neoplasm of breast 09/29/2015    Visit for screening mammogram    Personal history of diseases of the blood and blood-forming organs and certain disorders involving the immune mechanism     History of bleeding disorder    Personal history of other diseases of the musculoskeletal system and connective tissue     History of arthritis    Personal history of other diseases of the nervous system and sense organs     History of cataract    Personal history of other diseases of urinary system     History of bladder problems    Personal history of other  medical treatment 02/19/2021    History of screening mammography    Type 2 diabetes mellitus without complications (Multi) 10/05/2022    Diabetes mellitus     Past Surgical History:   Procedure Laterality Date    BLADDER SURGERY  10/23/2015    Bladder Surgery    BREAST SURGERY  10/23/2015    Breast Surgery    CATARACT EXTRACTION  10/23/2015    Cataract Surgery    COLON SURGERY  10/23/2015    Colon Surgery    HYSTERECTOMY  10/23/2015    Hysterectomy    OTHER SURGICAL HISTORY  10/23/2015    Excision Of The Leg     Social History     Tobacco Use    Smoking status: Former     Types: Cigarettes     Passive exposure: Past    Smokeless tobacco: Never   Substance Use Topics    Alcohol use: Never    Drug use: Never     Family History   Problem Relation Name Age of Onset    Other (coma) Other      Heart attack Other      Kidney failure Other       Current Outpatient Medications   Medication Sig Dispense Refill    alpha lipoic acid 200 mg capsule Take 1 capsule (200 mg) by mouth once daily.      aspirin 81 mg EC tablet Take 1 tablet (81 mg) by mouth once daily. 90 tablet 0    atorvastatin (Lipitor) 40 mg tablet TAKE 1 TABLET BY MOUTH ONCE  DAILY 100 tablet 2    blood sugar diagnostic (Accu-Chek Rylie Plus test strp) strip 1 strip once daily. 300 strip 2    calcitriol (Rocaltrol) 0.25 mcg capsule TAKE 1 CAPSULE BY MOUTH ONCE  DAILY 100 capsule 2    calcium carbonate 600 mg calcium (1,500 mg) tablet Take 1 tablet (1,500 mg) by mouth once daily. 90 tablet 0    cholecalciferol (Vitamin D-3) 50 mcg (2,000 unit) capsule Take 1 capsule (50 mcg) by mouth early in the morning.. 90 capsule 0    citalopram (CeleXA) 10 mg tablet TAKE 1 TABLET BY MOUTH ONCE  DAILY 90 tablet 3    cyanocobalamin (Vitamin B-12) 1,000 mcg tablet Take 1 tablet (1,000 mcg) by mouth once daily. 90 tablet 0    docusate sodium (Colace) 100 mg capsule Take 1 capsule (100 mg) by mouth once daily. 60 capsule 0    fish oil concentrate (Omega-3) 120-180 mg capsule Take  1 capsule (1 g) by mouth once daily.      fluticasone (Flonase) 50 mcg/actuation nasal spray USE 1 SPRAY IN BOTH NOSTRILS  TWICE DAILY 64 g 1    magnesium 250 mg tablet Take 1 tablet (250 mg) by mouth once daily.      multivitamin (MULTIPLE VITAMINS ORAL) Take 1 tablet by mouth once daily.      Myrbetriq 25 mg tablet extended release 24 hr 24 hr tablet TAKE 1 TABLET BY MOUTH ONCE  DAILY 90 tablet 3    polyethylene glycol (Glycolax, Miralax) 17 gram/dose powder Take by mouth once daily.       No current facility-administered medications for this visit.       Physical Examination:  Blood pressure 126/67, pulse 79, weight 68 kg (150 lb), SpO2 98%.  No distress  Left leg lipodermatosclerosis; edema well controlled    Pertinent Labs:    Pertinent Imaging:    Diagnoses and all orders for this visit:  Venous stasis syndrome (Primary)  Schedule in vein center to see if she's a candidate for more advanced therapy      Iza Martinez MD, MS

## 2025-03-05 ENCOUNTER — OFFICE VISIT (OUTPATIENT)
Dept: VASCULAR SURGERY | Facility: CLINIC | Age: 80
End: 2025-03-05
Payer: COMMERCIAL

## 2025-03-05 VITALS
OXYGEN SATURATION: 98 % | WEIGHT: 144 LBS | BODY MASS INDEX: 26.5 KG/M2 | SYSTOLIC BLOOD PRESSURE: 116 MMHG | HEART RATE: 73 BPM | DIASTOLIC BLOOD PRESSURE: 79 MMHG | HEIGHT: 62 IN

## 2025-03-05 DIAGNOSIS — I87.8 VENOUS STASIS SYNDROME: Primary | ICD-10-CM

## 2025-03-05 DIAGNOSIS — I83.893 SYMPTOMATIC VARICOSE VEINS OF BOTH LOWER EXTREMITIES: ICD-10-CM

## 2025-03-05 PROCEDURE — 3078F DIAST BP <80 MM HG: CPT | Performed by: NURSE PRACTITIONER

## 2025-03-05 PROCEDURE — 99213 OFFICE O/P EST LOW 20 MIN: CPT | Performed by: NURSE PRACTITIONER

## 2025-03-05 PROCEDURE — 3074F SYST BP LT 130 MM HG: CPT | Performed by: NURSE PRACTITIONER

## 2025-03-05 ASSESSMENT — ENCOUNTER SYMPTOMS
ENDOCRINE NEGATIVE: 1
ALLERGIC/IMMUNOLOGIC NEGATIVE: 1
CONSTITUTIONAL NEGATIVE: 1
RESPIRATORY NEGATIVE: 1
EYES NEGATIVE: 1
GASTROINTESTINAL NEGATIVE: 1
SHORTNESS OF BREATH: 0
COLOR CHANGE: 1
NEUROLOGICAL NEGATIVE: 1
PSYCHIATRIC NEGATIVE: 1
PALPITATIONS: 0
MUSCULOSKELETAL NEGATIVE: 1

## 2025-03-05 NOTE — PROGRESS NOTES
NPV REASON: BLE leg heaviness and edema    CURRENT ENCOUNTER:  Emma Oliva is 79 y.o. female here for leg heaviness and edema to BLE, with the left ankle being a little worse and the right. She is wearing 20-30mmHg compression stockings.   She uses 3 pillows to elevate her legs at night due to the aching, heaviness and swelling which help at times. She has had these symptoms for several years.     Previous vein procedures: R GSV vein stripping by Dr. Recinos in 2012  Previous phlebitis/DVT/PE: No  Previous venous ulcers: No  Family hx of varicose veins: sister  Using medical grade compression stockings: yes  Previous varicose veins bleeding: No    RIGHT LEG C1 Telangiectasias or reticular veins, C2 Varicose Veins, C2r Recurrent Varicose Veins, C3 Edema, C4a Pigmentation or Eczema, C4b Lipodermatosclerosis or atrophie ayleen, and C4c Corona Phlebectatica  RIGHT LEG PAIN 1, VARICOSE VEINS 2, VENOUS EDEMA 2, SKIN PIGMENTATION 2, INFLAMMATION 1, INDURATION 1, and USE OF COMPRESSION 3  RIGHT LEG CEAP: C4c  RIGHT LEG VCSS SCORE: 12    LEFT LEG C1 Telangiectasias or reticular veins, C2 Varicose Veins, C3 Edema, C4a Pigmentation or Eczema, C4b Lipodermatosclerosis or atrophie ayleen, and C4c Corona Phlebectatica  LEFT LEG PAIN 1, VARICOSE VEINS 2, VENOUS EDEMA 2, SKIN PIGMENTATION 1, INDURATION 1, and USE OF COMPRESSION 3  LEFT LEG CEAP: C4c  LEFT LEG VCSS SCORE: 10     PMH: HTN, HLD, Dm, CKD  PSH: myomectomy, R breast lumpectomy, R GSV stripping, R knee surgery, R ankle surgery   Soc:  former smoker, independent  Fam: no known fam hx of blood clots or clotting disorders  PastMedHX:  Past Medical History:   Diagnosis Date    Body mass index (BMI) 27.0-27.9, adult 12/13/2019    BMI 27.0-27.9,adult    Body mass index (BMI) 27.0-27.9, adult 08/07/2020    Body mass index (BMI) of 27.0 to 27.9 in adult    Body mass index (BMI) 29.0-29.9, adult 02/07/2020    BMI 29.0-29.9,adult    Encounter for gynecological examination (general)  (routine) without abnormal findings     Encounter for routine pelvic examination    Encounter for immunization 09/14/2020    Need for prophylactic vaccination and inoculation against influenza    Encounter for screening mammogram for malignant neoplasm of breast 09/05/2014    Visit for screening mammogram    Encounter for screening mammogram for malignant neoplasm of breast 09/29/2015    Visit for screening mammogram    Personal history of diseases of the blood and blood-forming organs and certain disorders involving the immune mechanism     History of bleeding disorder    Personal history of other diseases of the musculoskeletal system and connective tissue     History of arthritis    Personal history of other diseases of the nervous system and sense organs     History of cataract    Personal history of other diseases of urinary system     History of bladder problems    Personal history of other medical treatment 02/19/2021    History of screening mammography    Type 2 diabetes mellitus without complications (Multi) 10/05/2022    Diabetes mellitus     Past Surgical History:   Procedure Laterality Date    BLADDER SURGERY  10/23/2015    Bladder Surgery    BREAST SURGERY  10/23/2015    Breast Surgery    CATARACT EXTRACTION  10/23/2015    Cataract Surgery    COLON SURGERY  10/23/2015    Colon Surgery    HYSTERECTOMY  10/23/2015    Hysterectomy    OTHER SURGICAL HISTORY  10/23/2015    Excision Of The Leg     Family History   Problem Relation Name Age of Onset    Other (coma) Other      Heart attack Other      Kidney failure Other       Social History     Socioeconomic History    Marital status: Single     Spouse name: Not on file    Number of children: Not on file    Years of education: Not on file    Highest education level: Not on file   Occupational History    Not on file   Tobacco Use    Smoking status: Former     Types: Cigarettes     Passive exposure: Past    Smokeless tobacco: Never   Substance and Sexual  Activity    Alcohol use: Never    Drug use: Never    Sexual activity: Not on file   Other Topics Concern    Not on file   Social History Narrative    Not on file     Social Drivers of Health     Financial Resource Strain: Not on file   Food Insecurity: Not on file   Transportation Needs: Not on file   Physical Activity: Not on file   Stress: Not on file   Social Connections: Not on file   Intimate Partner Violence: Not on file   Housing Stability: Not on file     Meds:     Current Outpatient Medications:     alpha lipoic acid 200 mg capsule, Take 1 capsule (200 mg) by mouth once daily., Disp: , Rfl:     aspirin 81 mg EC tablet, Take 1 tablet (81 mg) by mouth once daily., Disp: 90 tablet, Rfl: 0    atorvastatin (Lipitor) 40 mg tablet, TAKE 1 TABLET BY MOUTH ONCE  DAILY, Disp: 100 tablet, Rfl: 2    blood sugar diagnostic (Accu-Chek Rylie Plus test strp) strip, 1 strip once daily., Disp: 300 strip, Rfl: 2    calcitriol (Rocaltrol) 0.25 mcg capsule, TAKE 1 CAPSULE BY MOUTH ONCE  DAILY, Disp: 100 capsule, Rfl: 2    calcium carbonate 600 mg calcium (1,500 mg) tablet, Take 1 tablet (1,500 mg) by mouth once daily., Disp: 90 tablet, Rfl: 0    cholecalciferol (Vitamin D-3) 50 mcg (2,000 unit) capsule, Take 1 capsule (50 mcg) by mouth early in the morning.., Disp: 90 capsule, Rfl: 0    citalopram (CeleXA) 10 mg tablet, TAKE 1 TABLET BY MOUTH ONCE  DAILY, Disp: 90 tablet, Rfl: 3    cyanocobalamin (Vitamin B-12) 1,000 mcg tablet, Take 1 tablet (1,000 mcg) by mouth once daily., Disp: 90 tablet, Rfl: 0    docusate sodium (Colace) 100 mg capsule, Take 1 capsule (100 mg) by mouth once daily., Disp: 60 capsule, Rfl: 0    fish oil concentrate (Omega-3) 120-180 mg capsule, Take 1 capsule (1 g) by mouth once daily., Disp: , Rfl:     fluticasone (Flonase) 50 mcg/actuation nasal spray, USE 1 SPRAY IN BOTH NOSTRILS  TWICE DAILY, Disp: 64 g, Rfl: 1    magnesium 250 mg tablet, Take 1 tablet (250 mg) by mouth once daily., Disp: , Rfl:      "multivitamin (MULTIPLE VITAMINS ORAL), Take 1 tablet by mouth once daily., Disp: , Rfl:     Myrbetriq 25 mg tablet extended release 24 hr 24 hr tablet, TAKE 1 TABLET BY MOUTH ONCE  DAILY, Disp: 90 tablet, Rfl: 3    polyethylene glycol (Glycolax, Miralax) 17 gram/dose powder, Take by mouth once daily., Disp: , Rfl:     Allergies:   No Known Allergies    ROS:  Review of Systems   Constitutional: Negative.    HENT: Negative.     Eyes: Negative.    Respiratory: Negative.  Negative for shortness of breath.    Cardiovascular:  Positive for leg swelling. Negative for chest pain and palpitations.   Gastrointestinal: Negative.    Endocrine: Negative.    Genitourinary: Negative.    Musculoskeletal: Negative.    Skin:  Positive for color change.   Allergic/Immunologic: Negative.    Neurological: Negative.    Psychiatric/Behavioral: Negative.        Objective:  Vitals:  Vitals:    03/05/25 1056   BP: 116/79   Pulse:    SpO2:     /79 (BP Location: Left arm, Patient Position: Sitting, BP Cuff Size: Adult)   Pulse 73   Ht 1.575 m (5' 2\")   Wt 65.3 kg (144 lb)   SpO2 98%   BMI 26.34 kg/m²     Exam:  Physical Exam  Constitutional:       Appearance: Normal appearance.   HENT:      Head: Normocephalic and atraumatic.      Nose: Nose normal.      Mouth/Throat:      Mouth: Mucous membranes are moist.   Eyes:      Conjunctiva/sclera: Conjunctivae normal.   Cardiovascular:      Rate and Rhythm: Normal rate.      Pulses: Normal pulses.   Pulmonary:      Effort: Pulmonary effort is normal.   Musculoskeletal:         General: Normal range of motion.      Cervical back: Normal range of motion.      Right lower leg: Edema present.      Left lower leg: Edema present.   Skin:     General: Skin is warm and dry.      Capillary Refill: Capillary refill takes less than 2 seconds.      Comments: Varicose veins to bilateral medial calves. Scattered BLE reticular veins. BLE +1pitting edema to ankles to mid calf. BLE hyperpigmentation with " LDS   Neurological:      General: No focal deficit present.      Mental Status: She is alert and oriented to person, place, and time.   Psychiatric:         Mood and Affect: Mood normal.         Behavior: Behavior normal.         Thought Content: Thought content normal.         Judgment: Judgment normal.       Labs:  Lab Results   Component Value Date    WBC 2.8 (L) 08/06/2024    WBC 2.6 (L) 10/05/2022    WBC 3.0 (L) 03/08/2022    HGB 10.9 (L) 08/06/2024    HGB 12.6 10/05/2022    HGB 11.5 (L) 03/08/2022    HCT 35.1 (L) 08/06/2024    HCT 39.2 10/05/2022    HCT 34.1 (L) 03/08/2022    MCV 96 08/06/2024    MCV 98 10/05/2022    MCV 92 03/08/2022     08/06/2024     Lab Results   Component Value Date    CREATININE 1.43 (H) 08/06/2024    CREATININE 1.47 (H) 11/13/2023    CREATININE 1.50 (H) 04/11/2023    BUN 34 (H) 08/06/2024    BUN 32 (H) 11/13/2023    BUN 37 (H) 04/11/2023     08/06/2024     11/13/2023     04/11/2023    K 4.3 08/06/2024    K 4.5 11/13/2023    K 4.5 04/11/2023     08/06/2024     11/13/2023     04/11/2023    CO2 27 08/06/2024    CO2 28 11/13/2023    CO2 30 04/11/2023       Imaging:                                          Assessment & Plan:  Emma Oliva is 79 y.o. female with a history of CKD, HLD, DM, HTN who is presents with leg heaviness and venous statis skin changes. leg heaviness and edema to BLE, with the left ankle being a little worse and the right. She is wearing 20-30mmHg compression stockings.   She uses 3 pillows to elevate her legs at night due to the aching, heaviness and swelling which help at times. She has had these symptoms for several years.     R GSV vein stripping by Dr. Recinos in 2020    3/5/25:  RIGHT LEG CEAP: C4c  RIGHT LEG VCSS SCORE: 12  LEFT LEG CEAP: C4c  LEFT LEG VCSS SCORE: 10    It was a pleasure taking care of you today and appreciate your seeing us at our [facility].     Today's  plan is as follows:  1) Elevate your legs at rest  2) Wear 20-30mmHg compression stockings, on during the day when standing, off while sleeping  3) No vascular procedures to offer at this time with surgery. Continue to follow-up with Dr. Cueto with Vascular medicine     Patient imaging and plan of care discussed with Dr. Ayaka Maria, DNP, APRN-CNP, AGPCNP-C, FNP-C, AGACNP-BC  Senior Nurse Practitioner, Vascular Surgery  [facility], [facility] [place]  [address] [facility], Office [phone]

## 2025-03-18 ENCOUNTER — APPOINTMENT (OUTPATIENT)
Dept: PRIMARY CARE | Facility: CLINIC | Age: 80
End: 2025-03-18
Payer: COMMERCIAL

## 2025-03-18 VITALS
HEART RATE: 76 BPM | BODY MASS INDEX: 26.7 KG/M2 | WEIGHT: 146 LBS | DIASTOLIC BLOOD PRESSURE: 64 MMHG | SYSTOLIC BLOOD PRESSURE: 106 MMHG

## 2025-03-18 DIAGNOSIS — Z12.31 ENCOUNTER FOR SCREENING MAMMOGRAM FOR MALIGNANT NEOPLASM OF BREAST: Primary | ICD-10-CM

## 2025-03-18 DIAGNOSIS — G62.9 PERIPHERAL POLYNEUROPATHY: ICD-10-CM

## 2025-03-18 DIAGNOSIS — H93.13 TINNITUS OF BOTH EARS: ICD-10-CM

## 2025-03-18 DIAGNOSIS — H92.03 OTALGIA OF BOTH EARS: ICD-10-CM

## 2025-03-18 DIAGNOSIS — E11.42 TYPE 2 DIABETES MELLITUS WITH DIABETIC POLYNEUROPATHY, WITHOUT LONG-TERM CURRENT USE OF INSULIN: ICD-10-CM

## 2025-03-18 DIAGNOSIS — I10 BENIGN ESSENTIAL HYPERTENSION: ICD-10-CM

## 2025-03-18 DIAGNOSIS — N18.32 CKD STAGE 3B, GFR 30-44 ML/MIN (MULTI): ICD-10-CM

## 2025-03-18 DIAGNOSIS — N64.59 NIPPLE PROBLEM: ICD-10-CM

## 2025-03-18 DIAGNOSIS — F32.5 MAJOR DEPRESSIVE DISORDER IN REMISSION, UNSPECIFIED WHETHER RECURRENT (CMS-HCC): ICD-10-CM

## 2025-03-18 DIAGNOSIS — E55.9 VITAMIN D DEFICIENCY: ICD-10-CM

## 2025-03-18 DIAGNOSIS — E78.01 FAMILIAL HYPERCHOLESTEREMIA: ICD-10-CM

## 2025-03-18 DIAGNOSIS — K59.09 CHRONIC CONSTIPATION: ICD-10-CM

## 2025-03-18 PROCEDURE — 1123F ACP DISCUSS/DSCN MKR DOCD: CPT | Performed by: INTERNAL MEDICINE

## 2025-03-18 PROCEDURE — 99214 OFFICE O/P EST MOD 30 MIN: CPT | Performed by: INTERNAL MEDICINE

## 2025-03-18 PROCEDURE — 3074F SYST BP LT 130 MM HG: CPT | Performed by: INTERNAL MEDICINE

## 2025-03-18 PROCEDURE — 69210 REMOVE IMPACTED EAR WAX UNI: CPT | Performed by: INTERNAL MEDICINE

## 2025-03-18 PROCEDURE — 1159F MED LIST DOCD IN RCRD: CPT | Performed by: INTERNAL MEDICINE

## 2025-03-18 PROCEDURE — 1158F ADVNC CARE PLAN TLK DOCD: CPT | Performed by: INTERNAL MEDICINE

## 2025-03-18 PROCEDURE — 3078F DIAST BP <80 MM HG: CPT | Performed by: INTERNAL MEDICINE

## 2025-03-18 PROCEDURE — 1170F FXNL STATUS ASSESSED: CPT | Performed by: INTERNAL MEDICINE

## 2025-03-18 PROCEDURE — G0439 PPPS, SUBSEQ VISIT: HCPCS | Performed by: INTERNAL MEDICINE

## 2025-03-18 PROCEDURE — 1036F TOBACCO NON-USER: CPT | Performed by: INTERNAL MEDICINE

## 2025-03-18 ASSESSMENT — ACTIVITIES OF DAILY LIVING (ADL)
BATHING: INDEPENDENT
DRESSING: INDEPENDENT
GROCERY_SHOPPING: INDEPENDENT
TAKING_MEDICATION: INDEPENDENT
MANAGING_FINANCES: INDEPENDENT
DOING_HOUSEWORK: INDEPENDENT

## 2025-03-18 ASSESSMENT — ENCOUNTER SYMPTOMS
DIZZINESS: 0
WOUND: 0
NUMBNESS: 0
OCCASIONAL FEELINGS OF UNSTEADINESS: 0
DYSURIA: 0
DEPRESSION: 0
DIFFICULTY URINATING: 0
SHORTNESS OF BREATH: 0
FEVER: 0
CONFUSION: 0
MYALGIAS: 0
COUGH: 0
DIARRHEA: 0
COLOR CHANGE: 1
EYE DISCHARGE: 0
CHILLS: 0
VOMITING: 0
ABDOMINAL PAIN: 0
WHEEZING: 0
LOSS OF SENSATION IN FEET: 0
NAUSEA: 0
FLANK PAIN: 0

## 2025-03-18 NOTE — PROGRESS NOTES
Subjective   Subjective:     History Of Present Illness:  Emma Oliva is a 79 y.o. female with a PMH of HTN, HLD, T2DM with neuropathy, left leg lipodermatosclerosis, MDD, CKD stage 3, vitamin D deficiency, vaginal prolapse, and chronic constipation, who presents to [facility] clinic for wellness visit.  Pt complained of ringing in the ears and would like in-office ear lavage performed today.      Medicare Wellness Billing Compliance Satisfied    *This is a visual tool to show completion of required items on the day of the visit. Green checks will only appear on the date of visit.    Review all medications by prescribing practitioner or clinical pharmacist (such as prescriptions, OTCs, herbal therapies and supplements) documented in the medical record    Past Medical, Surgical, and Family History reviewed and updated in chart    Tobacco Use Reviewed    Alcohol Use Reviewed    Illicit Drug Use Reviewed    PHQ2/9    Falls in Last Year Reviewed    Home Safety Risk Factors Reviewed    Cognitive Impairment Reviewed    Patient Self Assessment and Health Status    Current Diet Reviewed    Exercise Frequency    ADL - Hearing Impairment    ADL - Bathing    ADL - Dressing    ADL - Walks in Home    IADL - Managing Finances    IADL - Grocery Shopping    IADL - Taking Medications    IADL - Doing Housework      Past Medical History:  She has a past medical history of Body mass index (BMI) 27.0-27.9, adult (12/13/2019), Body mass index (BMI) 27.0-27.9, adult (08/07/2020), Body mass index (BMI) 29.0-29.9, adult (02/07/2020), Encounter for gynecological examination (general) (routine) without abnormal findings, Encounter for immunization (09/14/2020), Encounter for screening mammogram for malignant neoplasm of breast (09/05/2014), Encounter for screening mammogram for malignant neoplasm of breast (09/29/2015), Personal history of diseases of the blood and blood-forming organs and certain disorders involving the  immune mechanism, Personal history of other diseases of the musculoskeletal system and connective tissue, Personal history of other diseases of the nervous system and sense organs, Personal history of other diseases of urinary system, Personal history of other medical treatment (02/19/2021), and Type 2 diabetes mellitus without complications (Multi) (10/05/2022).    Past Surgical History:  She has a past surgical history that includes Hysterectomy (10/23/2015); Bladder surgery (10/23/2015); Cataract extraction (10/23/2015); Other surgical history (10/23/2015); Breast surgery (10/23/2015); and Colon surgery (10/23/2015).     Social History:  She reports that she has quit smoking. Her smoking use included cigarettes. She has been exposed to tobacco smoke. She has never used smokeless tobacco. She reports that she does not drink alcohol and does not use drugs.    Family History:  Family History   Problem Relation Name Age of Onset    Other (coma) Other      Heart attack Other      Kidney failure Other       Allergies:  Patient has no known allergies.    Home Medications:  (Not in a hospital admission)    Review Of Systems:  11-point ROS was performed and is negative except as noted below and in the HPI.     Review of Systems   Constitutional:  Negative for chills and fever.   Eyes:  Negative for discharge.   Respiratory:  Negative for cough, shortness of breath and wheezing.    Cardiovascular:  Negative for chest pain and leg swelling.   Gastrointestinal:  Negative for abdominal pain, diarrhea, nausea and vomiting.   Genitourinary:  Negative for difficulty urinating, dysuria and flank pain.   Musculoskeletal:  Negative for myalgias.   Skin:  Positive for color change (breast). Negative for wound.   Neurological:  Negative for dizziness and numbness.   Psychiatric/Behavioral:  Negative for confusion.         Objective   Objective:     /64 (BP Location: Left arm, Patient Position: Sitting, BP Cuff Size: Adult)    Pulse 76   Wt 66.2 kg (146 lb)   BMI 26.70 kg/m²     Physical Exam     Assessment & Plan:     Assessment/Plan     Problem List Items Addressed This Visit       Benign essential hypertension    Chronic constipation    Depression, major    Familial hypercholesteremia    Peripheral polyneuropathy    Tinnitus of both ears    Relevant Orders    Referral to Audiology    Otalgia of both ears    Relevant Orders    Referral to Audiology    Vitamin D deficiency    CKD stage 3b, GFR 30-44 ml/min (Multi)    Nipple problem    Relevant Orders    BI mammo bilateral diagnostic     Other Visit Diagnoses       Encounter for screening mammogram for malignant neoplasm of breast    -  Primary    Relevant Orders    BI mammo bilateral diagnostic          #Health Maintenance:  - Mammogram ordered    Revisit Topics: None    Dispo: Patient is scheduled to return to clinic in June.    Nadir Lewis, PGY-3  Internal Medicine     Disclaimer: Documentation completed with the information available at the time of input. The times in the chart may not be reflective of actual patient care times, interventions, or procedures. Documentation occurs after the physical care of the patient.

## 2025-03-18 NOTE — PROGRESS NOTES
I reviewed the resident/fellow's documentation and discussed the patient with the resident/fellow. I agree with the resident/fellow's medical decision making as documented in the note.  As the attending physician, I certify that I personally reviewed the patient's history and personally examined the patient to confirm the physical findings described above, and that I reviewed the relevant imaging studies and available reports. I also discussed the differential diagnosis and all of the proposed management plans with the patient and individuals accompanying the patient to this visit. They had the opportunity to ask questions about the proposed management plans and to have those questions answered.   Bilateral cerumen impaction  Lavage performed  Manually cleaned  Hearing back to baseline    Natalee Eid MD

## 2025-03-18 NOTE — ASSESSMENT & PLAN NOTE
stable   Patient is a 22 year old female without PMHX presenting to ED with c/o itchy rash to chest and arms that began while at work today. Patient states that she took Benadryl at 4 pm today and rash improved. Now c/o itchiness to her face. Patient stats that she got HPV vaccine to left upper arm 2 days ago and woke up today and noticed the area was erythematous and mildly swollen. Denies lip/tongue swelling, sob, sensation of throat closing, n/v/d

## 2025-04-01 ENCOUNTER — HOSPITAL ENCOUNTER (OUTPATIENT)
Dept: RADIOLOGY | Facility: CLINIC | Age: 80
Discharge: HOME | End: 2025-04-01
Payer: COMMERCIAL

## 2025-04-01 DIAGNOSIS — Z12.31 ENCOUNTER FOR SCREENING MAMMOGRAM FOR MALIGNANT NEOPLASM OF BREAST: ICD-10-CM

## 2025-04-01 DIAGNOSIS — N64.59 NIPPLE PROBLEM: ICD-10-CM

## 2025-04-01 PROCEDURE — 76982 USE 1ST TARGET LESION: CPT

## 2025-04-01 PROCEDURE — 77066 DX MAMMO INCL CAD BI: CPT | Performed by: STUDENT IN AN ORGANIZED HEALTH CARE EDUCATION/TRAINING PROGRAM

## 2025-04-01 PROCEDURE — 76642 ULTRASOUND BREAST LIMITED: CPT | Performed by: STUDENT IN AN ORGANIZED HEALTH CARE EDUCATION/TRAINING PROGRAM

## 2025-04-01 PROCEDURE — 77062 BREAST TOMOSYNTHESIS BI: CPT

## 2025-04-01 PROCEDURE — 76642 ULTRASOUND BREAST LIMITED: CPT

## 2025-04-01 PROCEDURE — G0279 TOMOSYNTHESIS, MAMMO: HCPCS | Performed by: STUDENT IN AN ORGANIZED HEALTH CARE EDUCATION/TRAINING PROGRAM

## 2025-04-10 ENCOUNTER — CLINICAL SUPPORT (OUTPATIENT)
Dept: AUDIOLOGY | Facility: CLINIC | Age: 80
End: 2025-04-10
Payer: COMMERCIAL

## 2025-04-10 DIAGNOSIS — H90.3 BILATERAL SENSORINEURAL HEARING LOSS: Primary | ICD-10-CM

## 2025-04-10 PROCEDURE — 92557 COMPREHENSIVE HEARING TEST: CPT

## 2025-04-10 PROCEDURE — 92567 TYMPANOMETRY: CPT

## 2025-04-10 ASSESSMENT — PAIN SCALES - GENERAL: PAINLEVEL_OUTOF10: 0 - NO PAIN

## 2025-04-10 ASSESSMENT — PAIN - FUNCTIONAL ASSESSMENT: PAIN_FUNCTIONAL_ASSESSMENT: 0-10

## 2025-04-15 NOTE — PROGRESS NOTES
AUDIOLOGIC EVALUATION      Name:  Emma Oliva  :  1945  Age:  79 y.o.  Date of Evaluation:  4/10/2025    Time: 2975-4335    HISTORY:  Emma Oliva, 79 y.o., was seen for an audiologic assessment. She reported she would like an updated assessment of her hearing. She noted she recently had her ears cleaned but they were not able to remove everything. She denied otalgia, otorrhea, tinnitus, dizziness, aural pressure/fullness, history of noise exposure, history of otologic surgeries, family history of hearing loss, and recent falls.       RESULTS:  Otoscopic Evaluation:  Right Ear: Non-occluding cerumen  Left Ear: Unremarkable    Cerumen removal in the right ear was attempted in office. However, the non-occluding cerumen was too hard to remove with a lighted curette.     Immittance Measures:  Right Ear: Type As -- indicating normal volume, pressure, and reduced tympanic membrane compliance  Left Ear: Type As -- indicating normal volume, pressure, and reduced tympanic membrane compliance    Acoustic Reflexes:  Right Ear: Could not test as hermetic seal could not be maintained.  Left Ear: Could not test as hermetic seal could not be maintained.    Pure Tone Audiometry:    Right Ear: Hearing within normal limits sloping to a moderately-severe sensorineural hearing loss    Left Ear: Hearing within normal limits sloping to a moderately-severe sensorineural hearing loss     Asymmetry: No    In comparison to previous audio completed on 20, her hearing has remained stable in both ears.     Reliability:   Good    Speech Audiometry:   Right: Speech Reception Threshold (SRT) was obtained at 15 dBHL.   SRT/PTA in Good agreement with pure tone average.    Left: Speech Reception Threshold (SRT) was obtained at 15 dBHL.   SRT/PTA in Good agreement with pure tone average.    Word Recognition Scores (WRS):  Right Ear: excellent (95%) in quiet when words were presented at 55 dBHL w/ masking.   Left Ear: excellent (96%) in quiet  when words were presented at 60 dBHL w/ masking.     Asymmetry: No    Quick Speech in Noise (QuickSIN):  Right Ear: Unaided sound field signal-to-noise ratio (SNR) loss of 6.5 which correlates to a mild SNR loss (3-7 dB), patient may hear almost as well as those with normal hearing are able to hear in noise.  Left Ear: Unaided sound field SNR loss of 4.5 which correlates to a mild SNR loss (3-7 dB), patient may hear almost as well as those with normal hearing are able to hear in noise.  Binaural: Unaided sound field SNR loss of 2.5 which correlates to normal/near normal (0-3 dB), patient may hear better than those with normal hearing are able to in noise.      IMPRESSIONS:  Today's testing revealed normal ear canal volume, middle ear pressure, and reduced tympanic membrane compliance in both ears. She has hearing within normal limits sloping to a moderately-severe sensorineural hearing loss in both ears. She has excellent word recognition in both ears. Her hearing has remained stable since her previous audiologic assessment. The results were discussed with the patient. She should follow-up with ENT for cerumen removal if at home measures (Debrox) do not resolve her cerumen impaction. She is a bilateral hearing aid candidate. She does not wish to pursue hearing aids at this time.        RECOMMENDATIONS:  1.) Schedule an evaluation with an Ears Nose and Throat (ENT) provider to address cerumen impaction in the right ear. For ENT scheduling, call [phone].   2. ) Return for audiologic evaluation  annually  to monitor hearing sensitivity and assess middle ear status or sooner should concerns arise. The audiology department can be reached at [phone] to schedule an appointment.       Mar Castro, CCC-A  Clinical Audiologist      KEY  SNHL Sensorineural Hearing Loss   CHL Conductive Hearing Loss   MHL Mixed Hearing Loss   SSNHL Sudden Sensorineural Hearing Loss   WNL Within Normal Limits   PTA Pure  Tone Average   TM Tympanic Membrane   ECV Ear Canal Volume   SRT Speech Reception Threshold   WRS Word Recognition Score

## 2025-04-22 DIAGNOSIS — T78.40XS ALLERGY, SEQUELA: ICD-10-CM

## 2025-04-22 RX ORDER — FLUTICASONE PROPIONATE 50 MCG
1 SPRAY, SUSPENSION (ML) NASAL 2 TIMES DAILY
Qty: 64 G | Refills: 1 | Status: SHIPPED | OUTPATIENT
Start: 2025-04-22

## 2025-05-20 ENCOUNTER — OFFICE VISIT (OUTPATIENT)
Dept: CARDIOLOGY | Facility: CLINIC | Age: 80
End: 2025-05-20
Payer: COMMERCIAL

## 2025-05-20 VITALS
WEIGHT: 157 LBS | DIASTOLIC BLOOD PRESSURE: 61 MMHG | SYSTOLIC BLOOD PRESSURE: 118 MMHG | HEART RATE: 56 BPM | BODY MASS INDEX: 28.72 KG/M2 | OXYGEN SATURATION: 98 %

## 2025-05-20 DIAGNOSIS — I87.2 CHRONIC VENOUS INSUFFICIENCY: Primary | ICD-10-CM

## 2025-05-20 PROCEDURE — 1160F RVW MEDS BY RX/DR IN RCRD: CPT | Performed by: INTERNAL MEDICINE

## 2025-05-20 PROCEDURE — 3074F SYST BP LT 130 MM HG: CPT | Performed by: INTERNAL MEDICINE

## 2025-05-20 PROCEDURE — 99213 OFFICE O/P EST LOW 20 MIN: CPT | Performed by: INTERNAL MEDICINE

## 2025-05-20 PROCEDURE — 1036F TOBACCO NON-USER: CPT | Performed by: INTERNAL MEDICINE

## 2025-05-20 PROCEDURE — 3078F DIAST BP <80 MM HG: CPT | Performed by: INTERNAL MEDICINE

## 2025-05-20 PROCEDURE — G2211 COMPLEX E/M VISIT ADD ON: HCPCS | Performed by: INTERNAL MEDICINE

## 2025-05-20 PROCEDURE — 1159F MED LIST DOCD IN RCRD: CPT | Performed by: INTERNAL MEDICINE

## 2025-05-20 NOTE — PROGRESS NOTES
History of Present Illness:  Emma Oliva is a/an 80 y.o. woman who follows up for bilateral leg swelling in the setting of chronic venous disease. She has a history of venous ablation in the past. We got a VI study , which showed multiple varicose veins, ablated GSV on the right, and reflux isolated to the calf on the left.     I referred her to the vein center but they feel she is not a candidate for any further invasive treatment.    She is quite active. She is wearing compression socks daily. Reports continuing to have swelling.     Working at gym 3 days a week; walks on the treadmill       Medical History[1]  Surgical History[2]  Social History[3]  Family History[4]  Current Medications[5]    Physical Examination:  Blood pressure 118/61, pulse 56, weight 71.2 kg (157 lb), SpO2 98%.  No distress  Mild swelling bilaterally  Severe lipodermatosclerosis bilaterally  Varicose veins right medial calf      Pertinent Labs:    Pertinent Imaging:    Diagnoses and all orders for this visit:  Chronic venous insufficiency (Primary)  Continue compression and physical activity  Increase elevation    Follow up in 6 months.    Iza Martinez MD, MS           [1]   Past Medical History:  Diagnosis Date    Body mass index (BMI) 27.0-27.9, adult 12/13/2019    BMI 27.0-27.9,adult    Body mass index (BMI) 27.0-27.9, adult 08/07/2020    Body mass index (BMI) of 27.0 to 27.9 in adult    Body mass index (BMI) 29.0-29.9, adult 02/07/2020    BMI 29.0-29.9,adult    Encounter for gynecological examination (general) (routine) without abnormal findings     Encounter for routine pelvic examination    Encounter for immunization 09/14/2020    Need for prophylactic vaccination and inoculation against influenza    Encounter for screening mammogram for malignant neoplasm of breast 09/05/2014    Visit for screening mammogram    Encounter for screening mammogram for malignant neoplasm of breast 09/29/2015    Visit for screening mammogram     Personal history of diseases of the blood and blood-forming organs and certain disorders involving the immune mechanism     History of bleeding disorder    Personal history of other diseases of the musculoskeletal system and connective tissue     History of arthritis    Personal history of other diseases of the nervous system and sense organs     History of cataract    Personal history of other diseases of urinary system     History of bladder problems    Personal history of other medical treatment 02/19/2021    History of screening mammography    Type 2 diabetes mellitus without complications 10/05/2022    Diabetes mellitus   [2]   Past Surgical History:  Procedure Laterality Date    BLADDER SURGERY  10/23/2015    Bladder Surgery    BREAST CYST EXCISION  40 years ago    BREAST SURGERY  10/23/2015    Breast Surgery    CATARACT EXTRACTION  10/23/2015    Cataract Surgery    COLON SURGERY  10/23/2015    Colon Surgery    HYSTERECTOMY  10/23/2015    Hysterectomy    OOPHORECTOMY  40 years ago    OTHER SURGICAL HISTORY  10/23/2015    Excision Of The Leg   [3]   Social History  Tobacco Use    Smoking status: Former     Types: Cigarettes     Passive exposure: Past    Smokeless tobacco: Never   Substance Use Topics    Alcohol use: Never    Drug use: Never   [4]   Family History  Problem Relation Name Age of Onset    Other (coma) Other      Heart attack Other      Kidney failure Other     [5]   Current Outpatient Medications   Medication Sig Dispense Refill    alpha lipoic acid 200 mg capsule Take 1 capsule (200 mg) by mouth once daily.      aspirin 81 mg EC tablet Take 1 tablet (81 mg) by mouth once daily. 90 tablet 0    atorvastatin (Lipitor) 40 mg tablet TAKE 1 TABLET BY MOUTH ONCE  DAILY 100 tablet 2    blood sugar diagnostic (Accu-Chek Rylie Plus test strp) strip 1 strip once daily. 300 strip 2    calcitriol (Rocaltrol) 0.25 mcg capsule TAKE 1 CAPSULE BY MOUTH ONCE  DAILY 100 capsule 2    calcium carbonate 600 mg calcium  (1,500 mg) tablet Take 1 tablet (1,500 mg) by mouth once daily. 90 tablet 0    cholecalciferol (Vitamin D-3) 50 mcg (2,000 unit) capsule Take 1 capsule (50 mcg) by mouth early in the morning.. 90 capsule 0    citalopram (CeleXA) 10 mg tablet TAKE 1 TABLET BY MOUTH ONCE  DAILY 90 tablet 3    cyanocobalamin (Vitamin B-12) 1,000 mcg tablet Take 1 tablet (1,000 mcg) by mouth once daily. 90 tablet 0    docusate sodium (Colace) 100 mg capsule Take 1 capsule (100 mg) by mouth once daily. 60 capsule 0    fish oil concentrate (Omega-3) 120-180 mg capsule Take 1 capsule (1 g) by mouth once daily.      fluticasone (Flonase) 50 mcg/actuation nasal spray USE 1 SPRAY IN BOTH NOSTRILS  TWICE DAILY 64 g 1    magnesium 250 mg tablet Take 1 tablet (250 mg) by mouth once daily.      multivitamin (MULTIPLE VITAMINS ORAL) Take 1 tablet by mouth once daily.      Myrbetriq 25 mg tablet extended release 24 hr 24 hr tablet TAKE 1 TABLET BY MOUTH ONCE  DAILY 90 tablet 3    polyethylene glycol (Glycolax, Miralax) 17 gram/dose powder Take by mouth once daily.       No current facility-administered medications for this visit.

## 2025-05-20 NOTE — PATIENT INSTRUCTIONS
Remember that there are three elements to control your swelling:    Compression  Elevation  Physical activity    You've got the physical activity down great. We could go stronger on your compression, but if we do that, they are much harder to put on. The one area we could work on is more elevation. If you are not doing your exercises or eating, then when you are sitting you should get those legs elevated! Especially if you are watching TV or working at the computer.    Should you have questions, please do not hesitate to call the office at [phone], or you can reach me on [employer] if you have signed up for it. If you have urgent concerns, call the office, do not use [employer].

## 2025-06-10 ENCOUNTER — APPOINTMENT (OUTPATIENT)
Dept: PRIMARY CARE | Facility: CLINIC | Age: 80
End: 2025-06-10
Payer: COMMERCIAL

## 2025-06-10 VITALS
BODY MASS INDEX: 27.42 KG/M2 | SYSTOLIC BLOOD PRESSURE: 139 MMHG | DIASTOLIC BLOOD PRESSURE: 72 MMHG | HEART RATE: 54 BPM | HEIGHT: 62 IN | WEIGHT: 149 LBS

## 2025-06-10 DIAGNOSIS — Z12.11 COLON CANCER SCREENING: ICD-10-CM

## 2025-06-10 DIAGNOSIS — E11.42 TYPE 2 DIABETES MELLITUS WITH DIABETIC POLYNEUROPATHY, WITHOUT LONG-TERM CURRENT USE OF INSULIN: ICD-10-CM

## 2025-06-10 DIAGNOSIS — R60.0 EDEMA OF BOTH LEGS: ICD-10-CM

## 2025-06-10 DIAGNOSIS — I10 BENIGN ESSENTIAL HYPERTENSION: Primary | ICD-10-CM

## 2025-06-10 DIAGNOSIS — E78.01 FAMILIAL HYPERCHOLESTEREMIA: ICD-10-CM

## 2025-06-10 PROCEDURE — 99214 OFFICE O/P EST MOD 30 MIN: CPT | Performed by: INTERNAL MEDICINE

## 2025-06-10 PROCEDURE — 3078F DIAST BP <80 MM HG: CPT | Performed by: INTERNAL MEDICINE

## 2025-06-10 PROCEDURE — 3075F SYST BP GE 130 - 139MM HG: CPT | Performed by: INTERNAL MEDICINE

## 2025-06-10 PROCEDURE — G2211 COMPLEX E/M VISIT ADD ON: HCPCS | Performed by: INTERNAL MEDICINE

## 2025-06-10 PROCEDURE — 1036F TOBACCO NON-USER: CPT | Performed by: INTERNAL MEDICINE

## 2025-06-10 PROCEDURE — 1159F MED LIST DOCD IN RCRD: CPT | Performed by: INTERNAL MEDICINE

## 2025-06-10 ASSESSMENT — LIFESTYLE VARIABLES
HOW MANY STANDARD DRINKS CONTAINING ALCOHOL DO YOU HAVE ON A TYPICAL DAY: PATIENT DOES NOT DRINK
HOW OFTEN DO YOU HAVE A DRINK CONTAINING ALCOHOL: NEVER
HOW OFTEN DO YOU HAVE SIX OR MORE DRINKS ON ONE OCCASION: NEVER
SKIP TO QUESTIONS 9-10: 1
AUDIT-C TOTAL SCORE: 0

## 2025-06-10 ASSESSMENT — ENCOUNTER SYMPTOMS
ABDOMINAL PAIN: 0
UNEXPECTED WEIGHT CHANGE: 0
SHORTNESS OF BREATH: 0

## 2025-06-10 ASSESSMENT — PATIENT HEALTH QUESTIONNAIRE - PHQ9
SUM OF ALL RESPONSES TO PHQ9 QUESTIONS 1 AND 2: 0
2. FEELING DOWN, DEPRESSED OR HOPELESS: NOT AT ALL
1. LITTLE INTEREST OR PLEASURE IN DOING THINGS: NOT AT ALL

## 2025-06-10 NOTE — PROGRESS NOTES
"Subjective   Patient ID: Emma Oliva is a 80 y.o. female who presents for Follow-up.    80f with HLD, depression, hx vitamin d deficiency, hx b12 deficiency, ckd3b, T2DM, chronic venous insufficiency who presents for a follow up visit.     Has swelling of feet and legs both feet. Saw Dr. Martinez. BLE edema started around 5/2024. No VALENZUELA, walks up and down steps at her home without trouble. Weight has gone down due to eating healthy and exercising.   Wearing compression socks. Saw vascular surgery and said she has venous insufficiency, but would try conservative measures at this point. Has chronic neuropathy to feet.   Quit in year 2000, had chest pain episode and then quit. Not smoking right now. 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   Hx R knee injury and surgery.   Breasts are sore and heavy. Negative mammogram and breast US recently   Saw audiology recently and had hearing test. Will see ENT to get earwax cleaned to ears.            Review of Systems   Constitutional:  Negative for unexpected weight change.   Respiratory:  Negative for shortness of breath.    Cardiovascular:  Positive for leg swelling. Negative for chest pain.   Gastrointestinal:  Negative for abdominal pain.   All other systems reviewed and are negative.      Objective   /72 (BP Location: Left arm, Patient Position: Sitting, BP Cuff Size: Adult)   Pulse 54   Ht 1.575 m (5' 2\")   Wt 67.6 kg (149 lb)   BMI 27.25 kg/m²     Physical Exam  Vitals reviewed.   Constitutional:       General: She is not in acute distress.  HENT:      Head: Normocephalic and atraumatic.   Eyes:      Extraocular Movements: Extraocular movements intact.      Conjunctiva/sclera: Conjunctivae normal.   Cardiovascular:      Rate and Rhythm: Normal rate and regular rhythm.      Heart sounds: No murmur heard.     No friction rub. No gallop.   Pulmonary:      Effort: Pulmonary effort is normal.      Breath sounds: Normal breath sounds. No wheezing, rhonchi or rales. "   Abdominal:      General: Bowel sounds are normal.      Palpations: Abdomen is soft. There is no mass.      Tenderness: There is no abdominal tenderness. There is no guarding or rebound.   Musculoskeletal:         General: Tenderness present.      Cervical back: Neck supple.      Right lower leg: Edema present.      Left lower leg: Edema present.      Comments: Ble edema 1+ to ankles and feet    Skin:     General: Skin is warm and dry.      Findings: No bruising or rash.   Neurological:      Mental Status: She is alert. Mental status is at baseline.      Comments: Answering questions, following commands. Moving all extremities.    Psychiatric:         Mood and Affect: Mood and affect normal.         Assessment/Plan   Problem List Items Addressed This Visit       Benign essential hypertension - Primary    139/72 today. Diet controlled.            Relevant Orders    Comprehensive metabolic panel    Familial hypercholesteremia    Relevant Orders    Lipid panel    Type 2 diabetes mellitus with diabetic polyneuropathy, without long-term current use of insulin    Diet controlled stable          Relevant Orders    CBC    Hemoglobin A1c    Colon cancer screening    Relevant Orders    Colonoscopy Screening; Average Risk Patient    Edema of both legs    Pt has ble venous insufficiency on US. However will repeat echo to eval for any heart failure, valvular problem that may contribute.            Relevant Orders    Transthoracic Echo (TTE) Complete     #health Maintenance  Colonoscopy - 8/2015, next 8/2025 will order   Mammogram - 4/2025 next 1y   DEXA - 4/2023 normal   Labs: get labs today   Immunizations: Shingles vaccine 12/2015, pneumonia vaccine 12/2015 conjugate, flu shot 2024, tetanus vaccine 1/2006 due can get at pharmacy

## 2025-06-10 NOTE — ASSESSMENT & PLAN NOTE
Pt has ble venous insufficiency on US. However will repeat echo to eval for any heart failure, valvular problem that may contribute.

## 2025-06-11 LAB
ALBUMIN SERPL-MCNC: 4.3 G/DL (ref 3.6–5.1)
ALP SERPL-CCNC: 83 U/L (ref 37–153)
ALT SERPL-CCNC: 12 U/L (ref 6–29)
ANION GAP SERPL CALCULATED.4IONS-SCNC: 12 MMOL/L (CALC) (ref 7–17)
AST SERPL-CCNC: 19 U/L (ref 10–35)
BILIRUB SERPL-MCNC: 0.7 MG/DL (ref 0.2–1.2)
BUN SERPL-MCNC: 37 MG/DL (ref 7–25)
CALCIUM SERPL-MCNC: 9.6 MG/DL (ref 8.6–10.4)
CHLORIDE SERPL-SCNC: 99 MMOL/L (ref 98–110)
CHOLEST SERPL-MCNC: 184 MG/DL
CHOLEST/HDLC SERPL: 2.4 (CALC)
CO2 SERPL-SCNC: 26 MMOL/L (ref 20–32)
CREAT SERPL-MCNC: 1.45 MG/DL (ref 0.6–0.95)
EGFRCR SERPLBLD CKD-EPI 2021: 36 ML/MIN/1.73M2
ERYTHROCYTE [DISTWIDTH] IN BLOOD BY AUTOMATED COUNT: 12.7 % (ref 11–15)
EST. AVERAGE GLUCOSE BLD GHB EST-MCNC: 123 MG/DL
EST. AVERAGE GLUCOSE BLD GHB EST-SCNC: 6.8 MMOL/L
GLUCOSE SERPL-MCNC: 75 MG/DL (ref 65–99)
HBA1C MFR BLD: 5.9 %
HCT VFR BLD AUTO: 40 % (ref 35–45)
HDLC SERPL-MCNC: 78 MG/DL
HGB BLD-MCNC: 12.3 G/DL (ref 11.7–15.5)
LDLC SERPL CALC-MCNC: 89 MG/DL (CALC)
MCH RBC QN AUTO: 29.9 PG (ref 27–33)
MCHC RBC AUTO-ENTMCNC: 30.8 G/DL (ref 32–36)
MCV RBC AUTO: 97.3 FL (ref 80–100)
NONHDLC SERPL-MCNC: 106 MG/DL (CALC)
PLATELET # BLD AUTO: 196 THOUSAND/UL (ref 140–400)
PMV BLD REES-ECKER: 11.2 FL (ref 7.5–12.5)
POTASSIUM SERPL-SCNC: 4.7 MMOL/L (ref 3.5–5.3)
PROT SERPL-MCNC: 7.5 G/DL (ref 6.1–8.1)
RBC # BLD AUTO: 4.11 MILLION/UL (ref 3.8–5.1)
SODIUM SERPL-SCNC: 137 MMOL/L (ref 135–146)
TRIGL SERPL-MCNC: 76 MG/DL
WBC # BLD AUTO: 2.3 THOUSAND/UL (ref 3.8–10.8)

## 2025-06-19 DIAGNOSIS — Z12.11 COLON CANCER SCREENING: Primary | ICD-10-CM

## 2025-06-19 RX ORDER — SIMETHICONE 125 MG
125 TABLET,CHEWABLE ORAL
Qty: 2 TABLET | Refills: 0 | Status: SHIPPED | OUTPATIENT
Start: 2025-06-19 | End: 2025-06-19

## 2025-06-19 RX ORDER — POLYETHYLENE GLYCOL 3350, SODIUM SULFATE ANHYDROUS, SODIUM BICARBONATE, SODIUM CHLORIDE, POTASSIUM CHLORIDE 236; 22.74; 6.74; 5.86; 2.97 G/4L; G/4L; G/4L; G/4L; G/4L
4 POWDER, FOR SOLUTION ORAL ONCE
Qty: 4000 ML | Refills: 0 | Status: SHIPPED | OUTPATIENT
Start: 2025-06-19 | End: 2025-06-19

## 2025-06-19 RX ORDER — SIMETHICONE 125 MG
125 TABLET,CHEWABLE ORAL
Qty: 2 TABLET | Refills: 0 | Status: SHIPPED | OUTPATIENT
Start: 2025-06-19

## 2025-06-26 ENCOUNTER — HOSPITAL ENCOUNTER (OUTPATIENT)
Dept: CARDIOLOGY | Facility: HOSPITAL | Age: 80
Discharge: HOME | End: 2025-06-26
Payer: COMMERCIAL

## 2025-06-26 DIAGNOSIS — R60.0 EDEMA OF BOTH LEGS: ICD-10-CM

## 2025-06-26 LAB
AORTIC VALVE MEAN GRADIENT: 5 MMHG
AORTIC VALVE PEAK VELOCITY: 1.55 M/S
AV PEAK GRADIENT: 10 MMHG
AVA (PEAK VEL): 1.95 CM2
AVA (VTI): 1.71 CM2
EJECTION FRACTION APICAL 4 CHAMBER: 66.1
EJECTION FRACTION: 67 %
LEFT ATRIUM VOLUME AREA LENGTH INDEX BSA: 31.3 ML/M2
LEFT VENTRICLE INTERNAL DIMENSION DIASTOLE: 3.62 CM (ref 3.5–6)
LEFT VENTRICULAR OUTFLOW TRACT DIAMETER: 1.96 CM
MITRAL VALVE E/A RATIO: 1.15
RIGHT VENTRICLE FREE WALL PEAK S': 14 CM/S
RIGHT VENTRICLE PEAK SYSTOLIC PRESSURE: 37 MMHG
TRICUSPID ANNULAR PLANE SYSTOLIC EXCURSION: 1.8 CM

## 2025-06-26 PROCEDURE — 93306 TTE W/DOPPLER COMPLETE: CPT | Performed by: INTERNAL MEDICINE

## 2025-06-26 PROCEDURE — 93306 TTE W/DOPPLER COMPLETE: CPT

## 2025-06-30 DIAGNOSIS — E78.01 FAMILIAL HYPERCHOLESTEREMIA: ICD-10-CM

## 2025-06-30 DIAGNOSIS — E11.9 TYPE 2 DIABETES MELLITUS WITHOUT COMPLICATION, UNSPECIFIED WHETHER LONG TERM INSULIN USE: ICD-10-CM

## 2025-06-30 DIAGNOSIS — F32.9 MAJOR DEPRESSIVE DISORDER, REMISSION STATUS UNSPECIFIED, UNSPECIFIED WHETHER RECURRENT: ICD-10-CM

## 2025-06-30 DIAGNOSIS — T78.40XS ALLERGY, SEQUELA: ICD-10-CM

## 2025-06-30 DIAGNOSIS — E55.9 VITAMIN D DEFICIENCY: ICD-10-CM

## 2025-06-30 RX ORDER — ATORVASTATIN CALCIUM 40 MG/1
40 TABLET, FILM COATED ORAL DAILY
Qty: 100 TABLET | Refills: 2 | Status: SHIPPED | OUTPATIENT
Start: 2025-06-30

## 2025-06-30 RX ORDER — CALCITRIOL 0.25 UG/1
0.25 CAPSULE ORAL DAILY
Qty: 100 CAPSULE | Refills: 2 | Status: SHIPPED | OUTPATIENT
Start: 2025-06-30

## 2025-06-30 RX ORDER — MIRABEGRON 25 MG/1
25 TABLET, FILM COATED, EXTENDED RELEASE ORAL DAILY
Qty: 100 TABLET | Refills: 2 | Status: SHIPPED | OUTPATIENT
Start: 2025-06-30

## 2025-08-08 ENCOUNTER — OFFICE VISIT (OUTPATIENT)
Dept: PRIMARY CARE | Facility: CLINIC | Age: 80
End: 2025-08-08
Payer: COMMERCIAL

## 2025-08-08 VITALS
HEIGHT: 62 IN | BODY MASS INDEX: 27.23 KG/M2 | HEART RATE: 62 BPM | SYSTOLIC BLOOD PRESSURE: 138 MMHG | DIASTOLIC BLOOD PRESSURE: 78 MMHG | WEIGHT: 148 LBS

## 2025-08-08 DIAGNOSIS — F32.5 MAJOR DEPRESSIVE DISORDER IN REMISSION, UNSPECIFIED WHETHER RECURRENT: Primary | ICD-10-CM

## 2025-08-08 DIAGNOSIS — E11.9 TYPE 2 DIABETES MELLITUS WITHOUT COMPLICATION, WITHOUT LONG-TERM CURRENT USE OF INSULIN: ICD-10-CM

## 2025-08-08 PROCEDURE — 99213 OFFICE O/P EST LOW 20 MIN: CPT | Performed by: INTERNAL MEDICINE

## 2025-08-08 PROCEDURE — G2211 COMPLEX E/M VISIT ADD ON: HCPCS | Performed by: INTERNAL MEDICINE

## 2025-08-08 PROCEDURE — 3075F SYST BP GE 130 - 139MM HG: CPT | Performed by: INTERNAL MEDICINE

## 2025-08-08 PROCEDURE — 3078F DIAST BP <80 MM HG: CPT | Performed by: INTERNAL MEDICINE

## 2025-08-08 ASSESSMENT — LIFESTYLE VARIABLES
HOW MANY STANDARD DRINKS CONTAINING ALCOHOL DO YOU HAVE ON A TYPICAL DAY: PATIENT DOES NOT DRINK
SKIP TO QUESTIONS 9-10: 1
HOW OFTEN DO YOU HAVE A DRINK CONTAINING ALCOHOL: NEVER
AUDIT-C TOTAL SCORE: 0
HOW OFTEN DO YOU HAVE SIX OR MORE DRINKS ON ONE OCCASION: NEVER

## 2025-08-08 ASSESSMENT — ENCOUNTER SYMPTOMS
GASTROINTESTINAL NEGATIVE: 1
CONSTITUTIONAL NEGATIVE: 1
RESPIRATORY NEGATIVE: 1

## 2025-08-08 NOTE — PROGRESS NOTES
Subjective   Patient ID: Emma Oliva is a 80 y.o. female who presents for Follow-up (Bilateral leg/knee pain).  HPI  The patient is here for a follow up on chronic medical problems.  His medications were reviewed, pharmacy updated, notes reviewed, prior labs reviewed.    Past Medical History   Medical History[1]   Past Surgical History:   Surgical History[2]  Family History:   Family History[3]  Social History:   Tobacco Use: Medium Risk (8/8/2025)    Patient History     Smoking Tobacco Use: Former     Smokeless Tobacco Use: Never     Passive Exposure: Past      Social History     Substance and Sexual Activity   Alcohol Use Never        Allergies:   RX Allergies[4]     ROS   Review of Systems   Constitutional: Negative.    Respiratory: Negative.     Cardiovascular:  Positive for leg swelling.   Gastrointestinal: Negative.         Objective   Vitals:    08/08/25 1428   BP: 138/78   Pulse: 62      BMI Readings from Last 15 Encounters:   08/08/25 27.07 kg/m²   06/10/25 27.25 kg/m²   05/20/25 28.72 kg/m²   03/18/25 26.70 kg/m²   03/05/25 26.34 kg/m²   01/02/25 27.44 kg/m²   12/12/24 27.62 kg/m²   11/26/24 28.35 kg/m²   10/08/24 27.80 kg/m²   08/06/24 29.08 kg/m²   06/18/24 28.35 kg/m²   03/08/24 28.72 kg/m²   02/20/24 28.35 kg/m²   01/30/24 28.35 kg/m²   01/30/24 28.46 kg/m²      67.1 kg (148 lb) (8/8/2025  2:28 PM)      Physical Exam  Physical Exam    Musculoskeletal:         General: Swelling and deformity present.      Right lower leg: Edema present.      Left lower leg: Edema present.     Neurological:      Mental Status: She is alert.     Psychiatric:         Mood and Affect: Mood normal.          Labs:  CBC:  Recent Labs     06/10/25  1311 08/06/24  1216 10/05/22  1434   WBC 2.3* 2.8* 2.6*   HGB 12.3 10.9* 12.6   HCT 40.0 35.1* 39.2    218 218   MCV 97.3 96 98     CMP:  Recent Labs     06/10/25  1311 08/06/24  1216 11/13/23  1408    138 139   K 4.7 4.3 4.5   CL 99 101 102   CO2 26 27 28   ANIONGAP 12 14  "14   BUN 37* 34* 32*   CREATININE 1.45* 1.43* 1.47*   EGFR 36* 37* 36*   GLUCOSE 75 92 76     Recent Labs     06/10/25  1311 08/06/24  1216 01/30/24  1456 11/13/23  1408   ALBUMIN 4.3 4.1  --  4.3   ALKPHOS 83 100  --  76   ALT 12 15 15 18   AST 19 19 20 22   BILITOT 0.7 0.3  --  0.4     Calcium/Phos:   Lab Results   Component Value Date    CALCIUM 9.6 06/10/2025    PHOS 3.1 03/08/2022      COAG:   Recent Labs     08/26/20  1045   INR 0.9     CRP: No results found for: \"CRP\"   [unfilled]   ENDO:  Recent Labs     06/10/25  1311 11/26/24  1354 08/06/24  1216 11/13/23  1408 04/11/23  1255 10/05/22  1434 09/07/21  1040 11/08/19  1034   TSH  --   --  2.04  --   --  2.12 2.50 1.62   HGBA1C 5.9* 5.9 5.5 5.6   < >  --   --   --     < > = values in this interval not displayed.      CARDIAC: No results for input(s): \"LDH\", \"CKMB\", \"TROPHS\", \"BNP\" in the last 79616 hours.    No lab exists for component: \"CK\", \"CKMBP\"  Recent Labs     06/10/25  1311 08/06/24  1216 01/30/24  1456 10/05/22  1434   CHOL 184 158 144 188   LDLF  --   --   --  95   LDLCALC 89  --  60  --    HDL 78 69.0 74.0 76.4   TRIG 76  --  52 85     No data recorded    Current Medications:  Current Medications[5]    Assessment and Plan  Emma was seen today for follow-up.  Diagnoses and all orders for this visit:  Major depressive disorder in remission, unspecified whether recurrent (Primary)  Type 2 diabetes mellitus without complication, without long-term current use of insulin   Bilateral LE edema due to venous stasis, lymphedema and advanced OA of both knees, has seen vascular specialist, unable to tolerate compression stockings, recommend frequent leg elevations throughout the day, ACE wraps during the day, topical Voltaren gel for the knees.   Recommend tdap and shingles vaccines at the pharmacy. Check urine albumin and repeat BW at the next visit.        Immunizations:  Immunization History   Administered Date(s) Administered    COVID-19, mRNA, LNP-S, " PF, 30 mcg/0.3 mL dose 04/01/2021, 04/22/2021, 11/17/2021    Flu vaccine (IIV4), preservative free *Check age/dose* 10/03/2014, 12/11/2015, 09/16/2016    Flu vaccine, quadrivalent, high-dose, preservative free, age 65y+ (FLUZONE) 10/05/2022, 11/13/2023    Flu vaccine, trivalent, preservative free, HIGH-DOSE, age 65y+ (Fluzone) 11/02/2018, 11/08/2019, 09/14/2020, 09/07/2021, 11/26/2024    Influenza, Unspecified 01/24/2006, 01/10/2008, 09/21/2010, 11/16/2012    Influenza, seasonal, injectable 11/04/2011, 09/06/2013    Pfizer Gray Cap SARS-CoV-2 08/03/2022    Pneumococcal conjugate vaccine, 13-valent (PREVNAR 13) 11/30/2015, 12/11/2015    Pneumococcal polysaccharide vaccine, 23-valent, age 2 years and older (PNEUMOVAX 23) 01/24/2006, 08/14/2009    Td (adult), unspecified 01/24/2006    Zoster, live 12/11/2015            Objective   [unfilled]    Assessment/Plan            Natalee Eid MD        [1]   Past Medical History:  Diagnosis Date    Body mass index (BMI) 27.0-27.9, adult 12/13/2019    BMI 27.0-27.9,adult    Body mass index (BMI) 27.0-27.9, adult 08/07/2020    Body mass index (BMI) of 27.0 to 27.9 in adult    Body mass index (BMI) 29.0-29.9, adult 02/07/2020    BMI 29.0-29.9,adult    Encounter for gynecological examination (general) (routine) without abnormal findings     Encounter for routine pelvic examination    Encounter for immunization 09/14/2020    Need for prophylactic vaccination and inoculation against influenza    Encounter for screening mammogram for malignant neoplasm of breast 09/05/2014    Visit for screening mammogram    Encounter for screening mammogram for malignant neoplasm of breast 09/29/2015    Visit for screening mammogram    Personal history of diseases of the blood and blood-forming organs and certain disorders involving the immune mechanism     History of bleeding disorder    Personal history of other diseases of the musculoskeletal system and connective tissue     History of  arthritis    Personal history of other diseases of the nervous system and sense organs     History of cataract    Personal history of other diseases of urinary system     History of bladder problems    Personal history of other medical treatment 02/19/2021    History of screening mammography    Type 2 diabetes mellitus without complications 10/05/2022    Diabetes mellitus   [2]   Past Surgical History:  Procedure Laterality Date    BLADDER SURGERY  10/23/2015    Bladder Surgery    BREAST CYST EXCISION  40 years ago    BREAST SURGERY  10/23/2015    Breast Surgery    CATARACT EXTRACTION  10/23/2015    Cataract Surgery    COLON SURGERY  10/23/2015    Colon Surgery    HYSTERECTOMY  10/23/2015    Hysterectomy    OOPHORECTOMY  40 years ago    OTHER SURGICAL HISTORY  10/23/2015    Excision Of The Leg   [3]   Family History  Problem Relation Name Age of Onset    Other (coma) Other      Heart attack Other      Kidney failure Other     [4] No Known Allergies  [5]   Current Outpatient Medications   Medication Sig Dispense Refill    aspirin 81 mg EC tablet Take 1 tablet (81 mg) by mouth once daily. 90 tablet 0    calcium carbonate 600 mg calcium (1,500 mg) tablet Take 1 tablet (1,500 mg) by mouth once daily. 90 tablet 0    cholecalciferol (Vitamin D-3) 50 mcg (2,000 unit) capsule Take 1 capsule (50 mcg) by mouth early in the morning.. 90 capsule 0    cyanocobalamin (Vitamin B-12) 1,000 mcg tablet Take 1 tablet (1,000 mcg) by mouth once daily. 90 tablet 0    atorvastatin (Lipitor) 40 mg tablet TAKE 1 TABLET BY MOUTH ONCE  DAILY 100 tablet 2    blood sugar diagnostic (Accu-Chek Rylie Plus test strp) strip 1 strip once daily. 300 strip 2    calcitriol (Rocaltrol) 0.25 mcg capsule TAKE 1 CAPSULE BY MOUTH ONCE  DAILY 100 capsule 2    citalopram (CeleXA) 10 mg tablet TAKE 1 TABLET BY MOUTH ONCE  DAILY 90 tablet 3    docusate sodium (Colace) 100 mg capsule Take 1 capsule (100 mg) by mouth once daily. 60 capsule 0    fish oil  concentrate (Omega-3) 120-180 mg capsule Take 1 capsule (1 g) by mouth once daily.      fluticasone (Flonase) 50 mcg/actuation nasal spray USE 1 SPRAY IN BOTH NOSTRILS  TWICE DAILY 64 g 1    magnesium 250 mg tablet Take 1 tablet (250 mg) by mouth once daily.      multivitamin (MULTIPLE VITAMINS ORAL) Take 1 tablet by mouth once daily.      Myrbetriq 25 mg tablet extended release 24 hr TAKE 1 TABLET BY MOUTH ONCE  DAILY 100 tablet 2    polyethylene glycol (Glycolax, Miralax) 17 gram/dose powder Take by mouth once daily.      simethicone (Mylicon) 125 mg chewable tablet Chew 1 tablet (125 mg) twice a day. Please take one (1) tablet with your first half of your bowel prep on the night before your colonoscopy. Then take one (1) tablet with the second half of your bowel prep on the day of your colonoscopy. 2 tablet 0     No current facility-administered medications for this visit.

## 2025-08-18 ENCOUNTER — TELEPHONE (OUTPATIENT)
Dept: PRIMARY CARE | Facility: CLINIC | Age: 80
End: 2025-08-18
Payer: COMMERCIAL

## 2025-08-19 DIAGNOSIS — M25.561 RIGHT KNEE PAIN, UNSPECIFIED CHRONICITY: ICD-10-CM

## 2025-08-22 ENCOUNTER — HOSPITAL ENCOUNTER (OUTPATIENT)
Dept: RADIOLOGY | Facility: CLINIC | Age: 80
Discharge: HOME | End: 2025-08-22
Payer: COMMERCIAL

## 2025-08-22 ENCOUNTER — OFFICE VISIT (OUTPATIENT)
Dept: ORTHOPEDIC SURGERY | Facility: CLINIC | Age: 80
End: 2025-08-22
Payer: COMMERCIAL

## 2025-08-22 VITALS — WEIGHT: 149 LBS | HEIGHT: 62 IN | BODY MASS INDEX: 27.42 KG/M2

## 2025-08-22 DIAGNOSIS — G89.29 CHRONIC PAIN OF LEFT KNEE: Primary | ICD-10-CM

## 2025-08-22 DIAGNOSIS — G89.29 CHRONIC PAIN OF RIGHT KNEE: ICD-10-CM

## 2025-08-22 DIAGNOSIS — M25.562 CHRONIC PAIN OF LEFT KNEE: Primary | ICD-10-CM

## 2025-08-22 DIAGNOSIS — M25.562 CHRONIC PAIN OF LEFT KNEE: ICD-10-CM

## 2025-08-22 DIAGNOSIS — M25.561 CHRONIC PAIN OF RIGHT KNEE: ICD-10-CM

## 2025-08-22 DIAGNOSIS — G89.29 CHRONIC PAIN OF LEFT KNEE: ICD-10-CM

## 2025-08-22 PROCEDURE — 20610 DRAIN/INJ JOINT/BURSA W/O US: CPT | Mod: LT | Performed by: STUDENT IN AN ORGANIZED HEALTH CARE EDUCATION/TRAINING PROGRAM

## 2025-08-22 PROCEDURE — 99212 OFFICE O/P EST SF 10 MIN: CPT | Mod: 25 | Performed by: STUDENT IN AN ORGANIZED HEALTH CARE EDUCATION/TRAINING PROGRAM

## 2025-08-22 PROCEDURE — 2500000004 HC RX 250 GENERAL PHARMACY W/ HCPCS (ALT 636 FOR OP/ED): Performed by: STUDENT IN AN ORGANIZED HEALTH CARE EDUCATION/TRAINING PROGRAM

## 2025-08-22 PROCEDURE — 73564 X-RAY EXAM KNEE 4 OR MORE: CPT | Mod: 50

## 2025-08-22 RX ORDER — LIDOCAINE HYDROCHLORIDE 10 MG/ML
4 INJECTION, SOLUTION INFILTRATION; PERINEURAL
Status: COMPLETED | OUTPATIENT
Start: 2025-08-22 | End: 2025-08-22

## 2025-08-22 RX ORDER — TRIAMCINOLONE ACETONIDE 40 MG/ML
40 INJECTION, SUSPENSION INTRA-ARTICULAR; INTRAMUSCULAR
Status: COMPLETED | OUTPATIENT
Start: 2025-08-22 | End: 2025-08-22

## 2025-08-22 RX ADMIN — LIDOCAINE HYDROCHLORIDE 4 ML: 10 INJECTION, SOLUTION INFILTRATION; PERINEURAL at 15:03

## 2025-08-22 RX ADMIN — TRIAMCINOLONE ACETONIDE 40 MG: 400 INJECTION, SUSPENSION INTRA-ARTICULAR; INTRAMUSCULAR at 15:03

## 2025-08-22 ASSESSMENT — PAIN - FUNCTIONAL ASSESSMENT: PAIN_FUNCTIONAL_ASSESSMENT: 0-10

## 2025-08-22 ASSESSMENT — PAIN SCALES - GENERAL: PAINLEVEL_OUTOF10: 5 - MODERATE PAIN

## 2025-08-22 ASSESSMENT — PAIN DESCRIPTION - DESCRIPTORS: DESCRIPTORS: ACHING

## 2025-09-04 ENCOUNTER — DOCUMENTATION (OUTPATIENT)
Dept: ORTHOPEDIC SURGERY | Facility: CLINIC | Age: 80
End: 2025-09-04
Payer: COMMERCIAL

## 2025-09-09 ENCOUNTER — APPOINTMENT (OUTPATIENT)
Dept: PRIMARY CARE | Facility: CLINIC | Age: 80
End: 2025-09-09
Payer: COMMERCIAL

## 2025-09-16 ENCOUNTER — APPOINTMENT (OUTPATIENT)
Dept: PRIMARY CARE | Facility: CLINIC | Age: 80
End: 2025-09-16
Payer: COMMERCIAL

## 2025-11-07 ENCOUNTER — APPOINTMENT (OUTPATIENT)
Dept: PRIMARY CARE | Facility: CLINIC | Age: 80
End: 2025-11-07
Payer: COMMERCIAL